# Patient Record
Sex: FEMALE | Race: WHITE | HISPANIC OR LATINO | Employment: FULL TIME | URBAN - METROPOLITAN AREA
[De-identification: names, ages, dates, MRNs, and addresses within clinical notes are randomized per-mention and may not be internally consistent; named-entity substitution may affect disease eponyms.]

---

## 2017-01-03 ENCOUNTER — HOSPITAL ENCOUNTER (OUTPATIENT)
Dept: CT IMAGING | Facility: HOSPITAL | Age: 47
Discharge: HOME | End: 2017-01-03
Admitting: NURSE PRACTITIONER

## 2017-01-03 PROCEDURE — 74150 CT ABDOMEN W/O CONTRAST: CPT

## 2017-04-19 DIAGNOSIS — R10.13 EPIGASTRIC PAIN: ICD-10-CM

## 2017-04-19 RX ORDER — OMEPRAZOLE 40 MG/1
CAPSULE, DELAYED RELEASE ORAL
Qty: 30 CAPSULE | Refills: 1 | Status: SHIPPED | OUTPATIENT
Start: 2017-04-19 | End: 2017-06-26 | Stop reason: SDUPTHER

## 2017-04-21 ENCOUNTER — OFFICE VISIT (OUTPATIENT)
Dept: FAMILY MEDICINE CLINIC | Facility: CLINIC | Age: 47
End: 2017-04-21

## 2017-04-21 VITALS
TEMPERATURE: 98.1 F | SYSTOLIC BLOOD PRESSURE: 110 MMHG | RESPIRATION RATE: 16 BRPM | DIASTOLIC BLOOD PRESSURE: 80 MMHG | OXYGEN SATURATION: 99 % | BODY MASS INDEX: 30.66 KG/M2 | HEART RATE: 67 BPM | WEIGHT: 184 LBS | HEIGHT: 65 IN

## 2017-04-21 DIAGNOSIS — R25.1 TREMORS OF NERVOUS SYSTEM: ICD-10-CM

## 2017-04-21 DIAGNOSIS — R53.83 FATIGUE, UNSPECIFIED TYPE: Primary | ICD-10-CM

## 2017-04-21 DIAGNOSIS — M25.50 PAIN, JOINT, MULTIPLE SITES: ICD-10-CM

## 2017-04-21 PROCEDURE — 99214 OFFICE O/P EST MOD 30 MIN: CPT | Performed by: NURSE PRACTITIONER

## 2017-04-21 NOTE — PROGRESS NOTES
Subjective   Breanne Silva is a 46 y.o. female.     History of Present Illness   Patient complains of bilateral hands and feet pain. The symptoms began 2 months ago.  Pain is a result of no known event.  Discomfort is described as aching. Symptoms are exacerbated by worse in the morning and evenings.  Evaluation to date: none. Therapy to date includes: nothing specific.    Patient also reports fatigue, intention tremors and intermittent tingling to right face.  Symptoms have all been present for a couple of months. Denies numbness, weakness or facial droop.    The following portions of the patient's history were reviewed and updated as appropriate: allergies, current medications, past family history, past medical history, past social history, past surgical history and problem list.    Review of Systems   Constitutional: Positive for fatigue.   Musculoskeletal: Positive for arthralgias. Negative for joint swelling.   Neurological: Positive for tremors and weakness. Negative for dizziness, seizures, syncope, facial asymmetry, speech difficulty, light-headedness, numbness and headaches.       Objective   Physical Exam   Constitutional: She is oriented to person, place, and time. She appears well-developed and well-nourished.   Eyes: Conjunctivae, EOM and lids are normal. Pupils are equal, round, and reactive to light.   Neck: Carotid bruit is not present.   Cardiovascular: Normal rate and regular rhythm.    Pulmonary/Chest: Effort normal and breath sounds normal.   Neurological: She is alert and oriented to person, place, and time. No cranial nerve deficit.   Psychiatric: She has a normal mood and affect. Judgment normal.   Vitals reviewed.      Assessment/Plan   Breanne was seen today for joint pain.    Diagnoses and all orders for this visit:    Fatigue, unspecified type  -     Magnesium  -     Vitamin B12  -     Vitamin D 25 Hydroxy  -     Rheumatoid Factor, Quant  -     ONI  -     C-reactive protein  -      Sedimentation rate, automated    Pain, joint, multiple sites  -     Magnesium  -     Vitamin B12  -     Vitamin D 25 Hydroxy  -     Rheumatoid Factor, Quant  -     ONI  -     C-reactive protein  -     Sedimentation rate, automated    Tremors of nervous system  -     Magnesium  -     Vitamin B12  -     Vitamin D 25 Hydroxy  -     Rheumatoid Factor, Quant  -     ONI  -     C-reactive protein  -     Sedimentation rate, automated

## 2017-04-24 LAB
25(OH)D3+25(OH)D2 SERPL-MCNC: 43.4 NG/ML (ref 30–100)
ANA SER QL: NEGATIVE
CRP SERPL-MCNC: 0.21 MG/DL (ref 0–0.5)
ERYTHROCYTE [SEDIMENTATION RATE] IN BLOOD BY WESTERGREN METHOD: 27 MM/HR (ref 0–20)
MAGNESIUM SERPL-MCNC: 2.2 MG/DL (ref 1.6–2.6)
RHEUMATOID FACT SERPL-ACNC: <10 IU/ML (ref 0–13.9)
VIT B12 SERPL-MCNC: 609 PG/ML (ref 211–946)

## 2017-04-25 ENCOUNTER — TELEPHONE (OUTPATIENT)
Dept: FAMILY MEDICINE CLINIC | Facility: CLINIC | Age: 47
End: 2017-04-25

## 2017-04-25 DIAGNOSIS — M25.50 PAIN, JOINT, MULTIPLE SITES: Primary | ICD-10-CM

## 2017-04-25 NOTE — TELEPHONE ENCOUNTER
Pt was called and message was left with info stated, pt to call with any questions. Referral to neurology placed

## 2017-04-25 NOTE — TELEPHONE ENCOUNTER
----- Message from AMMY Hernandez sent at 4/24/2017  4:44 PM EDT -----  Labs do not indicate cause for pain.  Refer to neurology for further workup of symptoms.

## 2017-06-26 DIAGNOSIS — R10.13 EPIGASTRIC PAIN: ICD-10-CM

## 2017-06-27 RX ORDER — OMEPRAZOLE 40 MG/1
CAPSULE, DELAYED RELEASE ORAL
Qty: 30 CAPSULE | Refills: 0 | Status: SHIPPED | OUTPATIENT
Start: 2017-06-27 | End: 2017-07-28 | Stop reason: SDUPTHER

## 2017-07-28 DIAGNOSIS — R10.13 EPIGASTRIC PAIN: ICD-10-CM

## 2017-07-28 RX ORDER — OMEPRAZOLE 40 MG/1
CAPSULE, DELAYED RELEASE ORAL
Qty: 14 CAPSULE | Refills: 0 | OUTPATIENT
Start: 2017-07-28 | End: 2020-10-27

## 2017-08-01 ENCOUNTER — APPOINTMENT (OUTPATIENT)
Dept: CT IMAGING | Facility: HOSPITAL | Age: 47
End: 2017-08-01

## 2017-08-01 ENCOUNTER — HOSPITAL ENCOUNTER (EMERGENCY)
Facility: HOSPITAL | Age: 47
Discharge: HOME | End: 2017-08-02
Attending: EMERGENCY MEDICINE | Admitting: EMERGENCY MEDICINE

## 2017-08-01 DIAGNOSIS — G43.109 COMPLICATED MIGRAINE: Primary | ICD-10-CM

## 2017-08-01 LAB
BASOPHILS # BLD AUTO: 0.02 10*3/MM3 (ref 0–0.2)
BASOPHILS NFR BLD AUTO: 0.4 % (ref 0–1.5)
DEPRECATED RDW RBC AUTO: 45.8 FL (ref 37–54)
EOSINOPHIL # BLD AUTO: 0.11 10*3/MM3 (ref 0–0.7)
EOSINOPHIL NFR BLD AUTO: 2.1 % (ref 0.3–6.2)
ERYTHROCYTE [DISTWIDTH] IN BLOOD BY AUTOMATED COUNT: 13.3 % (ref 11.7–13)
HCT VFR BLD AUTO: 42 % (ref 35.6–45.5)
HGB BLD-MCNC: 13.8 G/DL (ref 11.9–15.5)
IMM GRANULOCYTES # BLD: 0 10*3/MM3 (ref 0–0.03)
IMM GRANULOCYTES NFR BLD: 0 % (ref 0–0.5)
LYMPHOCYTES # BLD AUTO: 2.36 10*3/MM3 (ref 0.9–4.8)
LYMPHOCYTES NFR BLD AUTO: 45.7 % (ref 19.6–45.3)
MCH RBC QN AUTO: 30.9 PG (ref 26.9–32)
MCHC RBC AUTO-ENTMCNC: 32.9 G/DL (ref 32.4–36.3)
MCV RBC AUTO: 94.2 FL (ref 80.5–98.2)
MONOCYTES # BLD AUTO: 0.28 10*3/MM3 (ref 0.2–1.2)
MONOCYTES NFR BLD AUTO: 5.4 % (ref 5–12)
NEUTROPHILS # BLD AUTO: 2.39 10*3/MM3 (ref 1.9–8.1)
NEUTROPHILS NFR BLD AUTO: 46.4 % (ref 42.7–76)
PLATELET # BLD AUTO: 267 10*3/MM3 (ref 140–500)
PMV BLD AUTO: 9.9 FL (ref 6–12)
RBC # BLD AUTO: 4.46 10*6/MM3 (ref 3.9–5.2)
WBC NRBC COR # BLD: 5.16 10*3/MM3 (ref 4.5–10.7)

## 2017-08-01 PROCEDURE — 86140 C-REACTIVE PROTEIN: CPT | Performed by: EMERGENCY MEDICINE

## 2017-08-01 PROCEDURE — 83735 ASSAY OF MAGNESIUM: CPT | Performed by: EMERGENCY MEDICINE

## 2017-08-01 PROCEDURE — 85652 RBC SED RATE AUTOMATED: CPT | Performed by: EMERGENCY MEDICINE

## 2017-08-01 PROCEDURE — 84145 PROCALCITONIN (PCT): CPT | Performed by: EMERGENCY MEDICINE

## 2017-08-01 PROCEDURE — 85610 PROTHROMBIN TIME: CPT | Performed by: EMERGENCY MEDICINE

## 2017-08-01 PROCEDURE — 80053 COMPREHEN METABOLIC PANEL: CPT | Performed by: EMERGENCY MEDICINE

## 2017-08-01 PROCEDURE — 85025 COMPLETE CBC W/AUTO DIFF WBC: CPT | Performed by: EMERGENCY MEDICINE

## 2017-08-01 PROCEDURE — 99284 EMERGENCY DEPT VISIT MOD MDM: CPT

## 2017-08-01 PROCEDURE — 36415 COLL VENOUS BLD VENIPUNCTURE: CPT

## 2017-08-01 RX ORDER — SODIUM CHLORIDE 0.9 % (FLUSH) 0.9 %
10 SYRINGE (ML) INJECTION AS NEEDED
Status: DISCONTINUED | OUTPATIENT
Start: 2017-08-01 | End: 2017-08-02 | Stop reason: HOSPADM

## 2017-08-02 ENCOUNTER — APPOINTMENT (OUTPATIENT)
Dept: CT IMAGING | Facility: HOSPITAL | Age: 47
End: 2017-08-02

## 2017-08-02 VITALS
RESPIRATION RATE: 16 BRPM | HEIGHT: 64 IN | SYSTOLIC BLOOD PRESSURE: 113 MMHG | TEMPERATURE: 97.6 F | HEART RATE: 55 BPM | WEIGHT: 160 LBS | OXYGEN SATURATION: 96 % | DIASTOLIC BLOOD PRESSURE: 80 MMHG | BODY MASS INDEX: 27.31 KG/M2

## 2017-08-02 LAB
ALBUMIN SERPL-MCNC: 4.2 G/DL (ref 3.5–5.2)
ALBUMIN/GLOB SERPL: 1.3 G/DL
ALP SERPL-CCNC: 47 U/L (ref 39–117)
ALT SERPL W P-5'-P-CCNC: 18 U/L (ref 1–33)
ANION GAP SERPL CALCULATED.3IONS-SCNC: 11.7 MMOL/L
AST SERPL-CCNC: 19 U/L (ref 1–32)
BILIRUB SERPL-MCNC: 0.2 MG/DL (ref 0.1–1.2)
BUN BLD-MCNC: 19 MG/DL (ref 6–20)
BUN/CREAT SERPL: 27.5 (ref 7–25)
CALCIUM SPEC-SCNC: 9.7 MG/DL (ref 8.6–10.5)
CHLORIDE SERPL-SCNC: 102 MMOL/L (ref 98–107)
CO2 SERPL-SCNC: 27.3 MMOL/L (ref 22–29)
CREAT BLD-MCNC: 0.69 MG/DL (ref 0.57–1)
CRP SERPL-MCNC: 0.06 MG/DL (ref 0–0.5)
ERYTHROCYTE [SEDIMENTATION RATE] IN BLOOD: 12 MM/HR (ref 0–20)
GFR SERPL CREATININE-BSD FRML MDRD: 91 ML/MIN/1.73
GLOBULIN UR ELPH-MCNC: 3.3 GM/DL
GLUCOSE BLD-MCNC: 94 MG/DL (ref 65–99)
INR PPP: 1.03 (ref 0.9–1.1)
MAGNESIUM SERPL-MCNC: 2.1 MG/DL (ref 1.6–2.6)
POTASSIUM BLD-SCNC: 3.9 MMOL/L (ref 3.5–5.2)
PROCALCITONIN SERPL-MCNC: 0.09 NG/ML (ref 0.1–0.25)
PROT SERPL-MCNC: 7.5 G/DL (ref 6–8.5)
PROTHROMBIN TIME: 13.1 SECONDS (ref 11.7–14.2)
SODIUM BLD-SCNC: 141 MMOL/L (ref 136–145)

## 2017-08-02 PROCEDURE — 25010000002 PROCHLORPERAZINE EDISYLATE PER 10 MG: Performed by: EMERGENCY MEDICINE

## 2017-08-02 PROCEDURE — 96374 THER/PROPH/DIAG INJ IV PUSH: CPT

## 2017-08-02 PROCEDURE — 25010000002 DIPHENHYDRAMINE PER 50 MG: Performed by: EMERGENCY MEDICINE

## 2017-08-02 PROCEDURE — 70498 CT ANGIOGRAPHY NECK: CPT

## 2017-08-02 PROCEDURE — 70496 CT ANGIOGRAPHY HEAD: CPT

## 2017-08-02 PROCEDURE — 96375 TX/PRO/DX INJ NEW DRUG ADDON: CPT

## 2017-08-02 PROCEDURE — 96361 HYDRATE IV INFUSION ADD-ON: CPT

## 2017-08-02 PROCEDURE — 0 IOPAMIDOL 61 % SOLUTION: Performed by: EMERGENCY MEDICINE

## 2017-08-02 RX ORDER — DIPHENHYDRAMINE HYDROCHLORIDE 50 MG/ML
25 INJECTION INTRAMUSCULAR; INTRAVENOUS EVERY 6 HOURS PRN
Status: DISCONTINUED | OUTPATIENT
Start: 2017-08-02 | End: 2017-08-02 | Stop reason: HOSPADM

## 2017-08-02 RX ORDER — SUMATRIPTAN 25 MG/1
25 TABLET, FILM COATED ORAL
Qty: 30 TABLET | Refills: 0 | Status: SHIPPED | OUTPATIENT
Start: 2017-08-02 | End: 2021-08-25

## 2017-08-02 RX ADMIN — PROCHLORPERAZINE EDISYLATE 10 MG: 5 INJECTION INTRAMUSCULAR; INTRAVENOUS at 00:58

## 2017-08-02 RX ADMIN — IOPAMIDOL 85 ML: 612 INJECTION, SOLUTION INTRAVENOUS at 00:43

## 2017-08-02 RX ADMIN — SODIUM CHLORIDE 500 ML: 9 INJECTION, SOLUTION INTRAVENOUS at 00:03

## 2017-08-02 RX ADMIN — DIPHENHYDRAMINE HYDROCHLORIDE 25 MG: 50 INJECTION, SOLUTION INTRAMUSCULAR; INTRAVENOUS at 00:59

## 2017-08-02 RX ADMIN — SODIUM CHLORIDE 500 ML: 9 INJECTION, SOLUTION INTRAVENOUS at 00:56

## 2017-08-02 NOTE — ED PROVIDER NOTES
EMERGENCY DEPARTMENT ENCOUNTER    CHIEF COMPLAINT  Chief Complaint: neurologic deficit  History given by: patient  History limited by: nothing  Room Number: 14/14  PMD: Vincent Louis MD      HPI:  Pt is a 47 y.o. female who presents complaining of an episode of right sided peripheral vision loss yesterday. Pt states that she also felt off-balance and states that if she attempted to look to the right, she became nauseated. Pt  States that her symptoms resolved after 5-10 minutes. Pt states that she has had a HA, nausea and felt fatigued since her vision issues resolved. Pt states that she developed left sided neck pain and increased blurred vision earlier today. Pt denies trouble ambulating, family history of CVA or a history of diagnosed migraines. Pt states that she has had extreme muscle fatigue and intermittent blurred vision over the last 6 months but denies similar symptoms previously. Pt states that she has taken ibuprofen and Tylenol w/o relief.    Duration:  1.5 days  Onset: sudden  Timing: constant  Location: right peripheral vision  Radiation: none  Quality: loss of vision  Intensity/Severity: moderate to severe  Progression: resolved  Associated Symptoms: HA, nausea, fatigue, left sided neck pain, blurred vision  Aggravating Factors: none  Alleviating Factors: none  Previous Episodes: Pt states that she has had extreme muscle fatigue and intermittent blurred vision over the last 6 months but denies similar symptoms previously.  Treatment before arrival: Pt states that she has taken ibuprofen and Tylenol w/o relief.      PAST MEDICAL HISTORY  Active Ambulatory Problems     Diagnosis Date Noted   • Mass of breast 06/15/2012   • Seasonal allergic rhinitis 11/10/2016   • Asthma 11/10/2016     Resolved Ambulatory Problems     Diagnosis Date Noted   • No Resolved Ambulatory Problems     Past Medical History:   Diagnosis Date   • Asthma        PAST SURGICAL HISTORY  History reviewed. No pertinent surgical  history.    FAMILY HISTORY  Family History   Problem Relation Age of Onset   • Hyperlipidemia Father    • Hypertension Father    • Heart disease Maternal Grandmother    • Heart disease Paternal Grandmother    • Heart disease Paternal Grandfather        SOCIAL HISTORY  Social History     Social History   • Marital status: Single     Spouse name: N/A   • Number of children: N/A   • Years of education: N/A     Occupational History   • Not on file.     Social History Main Topics   • Smoking status: Former Smoker     Years: 25.00     Quit date: 5/18/2013   • Smokeless tobacco: Never Used   • Alcohol use No   • Drug use: No   • Sexual activity: Defer     Other Topics Concern   • Not on file     Social History Narrative       ALLERGIES  Review of patient's allergies indicates no known allergies.    REVIEW OF SYSTEMS  Review of Systems   Constitutional: Positive for fatigue. Negative for fever.   HENT: Negative for sore throat.    Eyes: Positive for visual disturbance (right peripheral vision loss, blurred vision).   Respiratory: Negative for cough and shortness of breath.    Cardiovascular: Negative for chest pain.   Gastrointestinal: Positive for nausea. Negative for abdominal pain, diarrhea and vomiting.   Genitourinary: Negative for dysuria.   Musculoskeletal: Positive for neck pain (left sided).   Skin: Negative for rash.   Allergic/Immunologic: Negative.    Neurological: Positive for headaches. Negative for weakness and numbness.   Hematological: Negative.    Psychiatric/Behavioral: Negative.    All other systems reviewed and are negative.      PHYSICAL EXAM  ED Triage Vitals   Temp Heart Rate Resp BP SpO2   08/01/17 2223 08/01/17 2223 08/01/17 2223 08/01/17 2228 08/01/17 2223   98.2 °F (36.8 °C) 72 16 139/88 97 %      Temp src Heart Rate Source Patient Position BP Location FiO2 (%)   08/01/17 2223 08/01/17 2223 -- -- --   Tympanic Monitor          Physical Exam   Constitutional: She is oriented to person, place, and  time and well-developed, well-nourished, and in no distress. No distress.   HENT:   Head: Normocephalic and atraumatic.   Eyes: EOM are normal. Pupils are equal, round, and reactive to light.   Neck: Normal range of motion. Neck supple.   Cardiovascular: Normal rate, regular rhythm and normal heart sounds.    Pulmonary/Chest: Effort normal and breath sounds normal. No respiratory distress.   Abdominal: Soft. There is no tenderness. There is no rebound and no guarding.   Musculoskeletal: Normal range of motion. She exhibits no edema.   Neurological: She is alert and oriented to person, place, and time. She has normal sensation and normal strength.   Pt has no focal neurologic deficit   Skin: Skin is warm and dry. No rash noted.   Psychiatric: Mood and affect normal.   Nursing note and vitals reviewed.      LAB RESULTS  Lab Results (last 24 hours)     Procedure Component Value Units Date/Time    Sedimentation Rate [[id number]]  (Normal) Collected:  08/01/17 2337    Specimen:  Blood Updated:  08/02/17 0027     Sed Rate 12 mm/hr     CBC & Differential [[id number]] Collected:  08/01/17 2337    Specimen:  Blood Updated:  08/01/17 2354    Narrative:       The following orders were created for panel order CBC & Differential.  Procedure                               Abnormality         Status                     ---------                               -----------         ------                     CBC Auto Differential[[id number]]        Abnormal            Final result                 Please view results for these tests on the individual orders.    CBC Auto Differential [[id number]]  (Abnormal) Collected:  08/01/17 2337    Specimen:  Blood Updated:  08/01/17 2354     WBC 5.16 10*3/mm3      RBC 4.46 10*6/mm3      Hemoglobin 13.8 g/dL      Hematocrit 42.0 %      MCV 94.2 fL      MCH 30.9 pg      MCHC 32.9 g/dL      RDW 13.3 (H) %      RDW-SD 45.8 fl      MPV 9.9 fL      Platelets 267 10*3/mm3      Neutrophil % 46.4 %       Lymphocyte % 45.7 (H) %      Monocyte % 5.4 %      Eosinophil % 2.1 %      Basophil % 0.4 %      Immature Grans % 0.0 %      Neutrophils, Absolute 2.39 10*3/mm3      Lymphocytes, Absolute 2.36 10*3/mm3      Monocytes, Absolute 0.28 10*3/mm3      Eosinophils, Absolute 0.11 10*3/mm3      Basophils, Absolute 0.02 10*3/mm3      Immature Grans, Absolute 0.00 10*3/mm3     Comprehensive Metabolic Panel [[id number]]  (Abnormal) Collected:  08/01/17 2355    Specimen:  Blood Updated:  08/02/17 0027     Glucose 94 mg/dL      BUN 19 mg/dL      Creatinine 0.69 mg/dL      Sodium 141 mmol/L      Potassium 3.9 mmol/L      Chloride 102 mmol/L      CO2 27.3 mmol/L      Calcium 9.7 mg/dL      Total Protein 7.5 g/dL      Albumin 4.20 g/dL      ALT (SGPT) 18 U/L      AST (SGOT) 19 U/L      Alkaline Phosphatase 47 U/L      Total Bilirubin 0.2 mg/dL      eGFR Non African Amer 91 mL/min/1.73      Globulin 3.3 gm/dL      A/G Ratio 1.3 g/dL      BUN/Creatinine Ratio 27.5 (H)     Anion Gap 11.7 mmol/L     Protime-INR [[id number]]  (Normal) Collected:  08/01/17 2355    Specimen:  Blood Updated:  08/02/17 0020     Protime 13.1 Seconds      INR 1.03    C-reactive Protein [[id number]]  (Normal) Collected:  08/01/17 2355    Specimen:  Blood Updated:  08/02/17 0027     C-Reactive Protein 0.06 mg/dL     Magnesium [[id number]]  (Normal) Collected:  08/01/17 2355    Specimen:  Blood Updated:  08/02/17 0027     Magnesium 2.1 mg/dL     Procalcitonin [[id number]]  (Abnormal) Collected:  08/01/17 2355    Specimen:  Blood Updated:  08/02/17 0033     Procalcitonin 0.09 (L) ng/mL     Narrative:       As a Marker for Sepsis (Non-Neonates):   1. <0.5 ng/mL represents a low risk of severe sepsis and/or septic shock.  1. >2 ng/mL represents a high risk of severe sepsis and/or septic shock.    As a Marker for Lower Respiratory Tract Infections that require antibiotic therapy:  PCT on Admission     Antibiotic Therapy             6-12 Hrs later  > 0.5                 "Strongly Recommended            >0.25 - <0.5         Recommended  0.1 - 0.25           Discouraged                   Remeasure/reassess PCT  <0.1                 Strongly Discouraged          Remeasure/reassess PCT      As 28 day mortality risk marker: \"Change in Procalcitonin Result\" (> 80 % or <=80 %) if Day 0 (or Day 1) and Day 4 values are available. Refer to http://www.[employer]pct-calculator[employer]/   Change in PCT <=80 %   A decrease of PCT levels below or equal to 80 % defines a positive change in PCT test result representing a higher risk for 28-day all-cause mortality of patients diagnosed with severe sepsis or septic shock.  Change in PCT > 80 %   A decrease of PCT levels of more than 80 % defines a negative change in PCT result representing a lower risk for 28-day all-cause mortality of patients diagnosed with severe sepsis or septic shock.                      I ordered the above labs and reviewed the results    RADIOLOGY  CT Angiogram Neck With & Without Contrast   Preliminary Result   1.  No hemodynamically significant stenosis or aneurysm identified.   2.  0.4 cm density image 29 in the right frontal lobe, likely to be a   calcification, intra-axial hemorrhage appears less likely, follow-up is   recommended.       ----------------------------------------------------------------       CT ANGIOGRAM NECK WITH AND WITHOUT CONTRAST.       TECHNIQUE: Routine enhanced CTA of the neck was performed. Sagittal and   coronal 2-dimensional reformations are provided for review. Also,   multiple 3-dimensional reformations are provided for review.       HISTORY:  Headache.       COMPARISON:  Right-sided vision loss.       FINDINGS:         The visualized aortic arch and great arteries are widely patent. The   innominate and bilateral subclavian arteries are patent.           The visualized bilateral common carotid arteries are patent without   hemodynamically significant stenosis.            The diameter of the origin " of the right internal carotid artery is 7.1   mm.  The diameter of the more distal right internal carotid artery,   distal to the carotid bulb, is 5.4 mm.  These values predict no   significant stenosis by NASCET criteria.       The diameter of the origin of the left internal carotid artery is 7.6   mm.  The diameter of the more distal left internal carotid artery,   distal to the carotid bulb, is 7 mm.  These values predict no   significant stenosis by NASCET criteria.       The more distal bilateral internal carotid arteries are continuously   patent without hemodynamically significant stenosis.        The bilateral external carotid arteries are patent.        The bilateral vertebral arteries are patent.       No neck CTA evidence of dissection, arteritis, fibromuscular dysplasia,   arterial occlusion, thromboembolic filling defect, hemodynamically   significant stenosis, or aneurysm.       IMPRESSION:         No hemodynamically significant stenosis.  No significant NASCET criteria   stenosis in the bilateral proximal internal carotid arteries is   appreciated.                      CT Angiogram Head With Contrast   Preliminary Result   1.  No hemodynamically significant stenosis or aneurysm identified.   2.  0.4 cm density image 29 in the right frontal lobe, likely to be a   calcification, intra-axial hemorrhage appears less likely, follow-up is   recommended.       ----------------------------------------------------------------       CT ANGIOGRAM NECK WITH AND WITHOUT CONTRAST.       TECHNIQUE: Routine enhanced CTA of the neck was performed. Sagittal and   coronal 2-dimensional reformations are provided for review. Also,   multiple 3-dimensional reformations are provided for review.       HISTORY:  Headache.       COMPARISON:  Right-sided vision loss.       FINDINGS:         The visualized aortic arch and great arteries are widely patent. The   innominate and bilateral subclavian arteries are patent.            The visualized bilateral common carotid arteries are patent without   hemodynamically significant stenosis.            The diameter of the origin of the right internal carotid artery is 7.1   mm.  The diameter of the more distal right internal carotid artery,   distal to the carotid bulb, is 5.4 mm.  These values predict no   significant stenosis by NASCET criteria.       The diameter of the origin of the left internal carotid artery is 7.6   mm.  The diameter of the more distal left internal carotid artery,   distal to the carotid bulb, is 7 mm.  These values predict no   significant stenosis by NASCET criteria.       The more distal bilateral internal carotid arteries are continuously   patent without hemodynamically significant stenosis.        The bilateral external carotid arteries are patent.        The bilateral vertebral arteries are patent.       No neck CTA evidence of dissection, arteritis, fibromuscular dysplasia,   arterial occlusion, thromboembolic filling defect, hemodynamically   significant stenosis, or aneurysm.       IMPRESSION:         No hemodynamically significant stenosis.  No significant NASCET criteria   stenosis in the bilateral proximal internal carotid arteries is   appreciated.                           I ordered the above noted radiological studies. Interpreted by radiologist. Reviewed by me in PACS.       PROCEDURES  Procedures      PROGRESS AND CONSULTS  ED Course     0025- D/w pt and family that the pt is most likely not having a stroke since she does have a HA. D/w pt and family that the pt's symptoms are c/w a complex migraine. Discussed the plan to order CTA Head and Neck for further evaluation of their symptoms. Pt and family agree with the plan and all questions were addressed.    0037- Ordered Compazine and Benadryl for HA and IVF for hydration.     0142- Rechecked pt. Pt states that her HA has improved after the medication. Notified pt and family of the pt's unremarkable CTA  Head and Neck results. D/w pt and family that her symptoms are c/w a complex migraine. Discussed the plan to discharge the pt home with a prescription for migraine medication. Pt and family agree with the plan and all questions were addressed.    1:44 AM  Latest vital signs   BP- 115/82 HR- 59 Temp- 98.2 °F (36.8 °C) (Tympanic) O2 sat- 97%      MEDICAL DECISION MAKING  Results were reviewed/discussed with the patient and they were also made aware of online access. Pt also made aware that some labs, such as cultures, will not be resulted during ER visit and follow up with PMD is necessary.     MDM  Number of Diagnoses or Management Options     Amount and/or Complexity of Data Reviewed  Clinical lab tests: ordered and reviewed (Labs are unremarkable)  Tests in the radiology section of CPT®: ordered and reviewed (CTA Head and Neck show nothing acute)  Independent visualization of images, tracings, or specimens: yes    Pt is not a tPA candidate due to NIHSS=0       DIAGNOSIS  Final diagnoses:   Complicated migraine with aura       DISPOSITION  DISCHARGE    Patient discharged in stable condition.    Reviewed implications of results, diagnosis, meds, responsibility to follow up, warning signs and symptoms of possible worsening, potential complications and reasons to return to ER, including fever, worsening pain or any concerns.    Patient/Family voiced understanding of above instructions.    Discussed plan for discharge, as there is no emergent indication for admission.  Pt/family is agreeable and understands need for follow up and repeat testing.  Pt is aware that discharge does not mean that nothing is wrong but it indicates no emergency is present that requires admission and they must continue care with follow-up as given below or physician of their choice.     FOLLOW-UP  Vincent Louis MD  [id number] [address] [address] [phone]    Call  As needed, If symptoms worsen         Medication List       New Prescriptions          SUMAtriptan 25 MG tablet   Commonly known as:  IMITREX   Take 1 tablet by mouth Every 2 (Two) Hours As Needed for Migraine. Take   one tablet at onset of headache. May repeat dose one time in 2 hours               Latest Documented Vital Signs:  As of 2:05 AM  BP- 115/82 HR- 59 Temp- 98.2 °F (36.8 °C) (Tympanic) O2 sat- 97%    --  Documentation assistance provided by ever Chow for Dr. Nava.  Information recorded by the ever was done at my direction and has been verified and validated by me.        Rajni Chow  08/02/17 0209       Lonnie Nava MD  08/02/17 0252

## 2017-08-02 NOTE — ED TRIAGE NOTES
Pt states she had loss of vision in her peripheral vision to her right eye yesterday lasted about 5 mins.  States she had a headache afterwards and the headache has not let up since. Now having left neck pain. States she has had intermittent numbness and tingling to the right side of her face.

## 2017-08-02 NOTE — ED NOTES
"Pt to Room 14 via private vehicle from home with c/o sudden onset of right sided peripheral vision loss occurring yesterday for 5 minutes.  Pt states that she has had neurological symptoms on and off for the last month that primarily present in left sided numbness to her face.  Pt also complains of right sided neck pain.  Pt states that she did have a HA start after the vision loss yesterday.  Pt presents today with diplopia, headache throughout and intermittent nausea.  Pt is alert and oriented X4, PERRLA, respirations are even and unlabored, chest rise and fall is equal in expansion.  Pt does not appear to be in distress at this time.  Pt denies fever, chills, chest pain, abdominal pain, loss in control of bowel/bladder.  Bed in low position, call light within reach, side rails up X2.  Pt states that she is suppose to follow up with a [facility]ist next Tuesday for evaluation.  Pt was seen by her PCP and had \"extensive blood work done.\"       Zuleima Hdz RN  08/01/17 5351    "

## 2017-08-09 ENCOUNTER — TELEPHONE (OUTPATIENT)
Dept: SOCIAL WORK | Facility: HOSPITAL | Age: 47
End: 2017-08-09

## 2017-08-09 NOTE — TELEPHONE ENCOUNTER
ED f/u phone call: states that she is not feeling much better, and followed up w/ neurologist yesterday. No questions/concerns  
yes

## 2017-11-30 ENCOUNTER — OFFICE VISIT (OUTPATIENT)
Dept: FAMILY MEDICINE CLINIC | Facility: CLINIC | Age: 47
End: 2017-11-30

## 2017-11-30 VITALS
OXYGEN SATURATION: 97 % | SYSTOLIC BLOOD PRESSURE: 119 MMHG | RESPIRATION RATE: 16 BRPM | HEIGHT: 65 IN | TEMPERATURE: 98.2 F | BODY MASS INDEX: 26.49 KG/M2 | HEART RATE: 58 BPM | DIASTOLIC BLOOD PRESSURE: 69 MMHG | WEIGHT: 159 LBS

## 2017-11-30 DIAGNOSIS — J01.00 ACUTE NON-RECURRENT MAXILLARY SINUSITIS: Primary | ICD-10-CM

## 2017-11-30 PROCEDURE — 99213 OFFICE O/P EST LOW 20 MIN: CPT | Performed by: FAMILY MEDICINE

## 2017-11-30 RX ORDER — AZITHROMYCIN 250 MG/1
TABLET, FILM COATED ORAL
Qty: 6 TABLET | Refills: 1 | Status: SHIPPED | OUTPATIENT
Start: 2017-11-30 | End: 2018-03-23

## 2017-11-30 NOTE — PROGRESS NOTES
"Subjective   Breanne Silva is a 47 y.o. female.     CC: URI    History of Present Illness     Breanne Silva 47 y.o. female who presents for evaluation of sinus pressure and congestion, cough. Symptoms include congestion and dry cough.  Onset of symptoms was 3 days ago, gradually worsening since that time. Patient denies hemoptysis.   Evaluation to date: none Treatment to date:  none.       The following portions of the patient's history were reviewed and updated as appropriate: allergies, current medications, past family history, past medical history, past social history, past surgical history and problem list.    Review of Systems   Constitutional: Negative for activity change, chills, fatigue and fever.   HENT: Positive for congestion and sinus pressure.    Respiratory: Positive for cough. Negative for chest tightness.    Cardiovascular: Negative for chest pain and palpitations.   Gastrointestinal: Negative for abdominal pain and nausea.   Endocrine: Negative for cold intolerance.   Psychiatric/Behavioral: Negative for behavioral problems and dysphoric mood.     /69  Pulse 58  Temp 98.2 °F (36.8 °C) (Oral)   Resp 16  Ht 64.5\" (163.8 cm)  Wt 159 lb (72.1 kg)  SpO2 97%  BMI 26.87 kg/m2    Objective   Physical Exam   Constitutional: She appears well-developed and well-nourished.   HENT:   Nose: Right sinus exhibits maxillary sinus tenderness. Left sinus exhibits maxillary sinus tenderness.   Neck: Neck supple. No thyromegaly present.   Cardiovascular: Normal rate and regular rhythm.    No murmur heard.  Pulmonary/Chest: Effort normal and breath sounds normal.   Lymphadenopathy:     She has no cervical adenopathy.   Psychiatric: She has a normal mood and affect. Her behavior is normal.   Nursing note and vitals reviewed.      Assessment/Plan   Breanne was seen today for nasal congestion, sinusitis, sore throat, asthma and earache.    Diagnoses and all orders for this visit:    Acute " non-recurrent maxillary sinusitis  -     azithromycin (ZITHROMAX Z-GABRIELA) 250 MG tablet; Take 2 tablets the first day, then 1 tablet daily for 4 days.

## 2018-03-23 ENCOUNTER — OFFICE VISIT (OUTPATIENT)
Dept: FAMILY MEDICINE CLINIC | Facility: CLINIC | Age: 48
End: 2018-03-23

## 2018-03-23 VITALS
TEMPERATURE: 97.7 F | SYSTOLIC BLOOD PRESSURE: 113 MMHG | WEIGHT: 165 LBS | BODY MASS INDEX: 27.49 KG/M2 | HEART RATE: 77 BPM | HEIGHT: 65 IN | RESPIRATION RATE: 16 BRPM | DIASTOLIC BLOOD PRESSURE: 70 MMHG

## 2018-03-23 DIAGNOSIS — J04.0 LARYNGITIS: Primary | ICD-10-CM

## 2018-03-23 PROCEDURE — 99213 OFFICE O/P EST LOW 20 MIN: CPT | Performed by: NURSE PRACTITIONER

## 2018-03-23 RX ORDER — PREDNISONE 20 MG/1
TABLET ORAL
Qty: 20 TABLET | Refills: 0 | Status: SHIPPED | OUTPATIENT
Start: 2018-03-23 | End: 2019-02-15

## 2018-03-23 RX ORDER — TOPIRAMATE 25 MG/1
CAPSULE, EXTENDED RELEASE ORAL
COMMUNITY
Start: 2018-03-08 | End: 2018-03-23

## 2018-03-23 RX ORDER — AMOXICILLIN 500 MG/1
CAPSULE ORAL
COMMUNITY
Start: 2018-03-14 | End: 2018-07-13

## 2018-03-23 RX ORDER — PHENTERMINE HYDROCHLORIDE 37.5 MG/1
37.5 TABLET ORAL EVERY MORNING
Refills: 0 | COMMUNITY
Start: 2018-03-09 | End: 2018-07-13

## 2018-03-23 NOTE — PROGRESS NOTES
Subjective   Breanne Silva is a 47 y.o. female.     History of Present Illness   Breanne Silva 47 y.o. female who presents for evaluation of sore throat. Symptoms include sore throat, post nasal drip and hoarseness.  Onset of symptoms was 2 week ago, gradually improving since that time. Patient denies shortness of breath, wheezing, fever.   Evaluation to date: was seen at [facility] Treatment to date:  antibiotics from prior visit. She finished amoxicillin yesterday. States throat is no longer sore but she is very hoarse and still has post nasal drainage. Reports mild dry cough at times that she feels is more from irritation in the throat.     The following portions of the patient's history were reviewed and updated as appropriate: allergies, current medications, past family history, past medical history, past social history, past surgical history and problem list.    Review of Systems   Constitutional: Positive for fatigue. Negative for chills and fever.   HENT: Positive for postnasal drip. Negative for congestion, sore throat and trouble swallowing.    Respiratory: Positive for cough. Negative for shortness of breath and wheezing.        Objective   Physical Exam   Constitutional: She is oriented to person, place, and time. She appears well-developed and well-nourished.   HENT:   Right Ear: Tympanic membrane, external ear and ear canal normal.   Left Ear: Tympanic membrane, external ear and ear canal normal.   Nose: No mucosal edema or rhinorrhea.   Mouth/Throat: Uvula is midline. Posterior oropharyngeal erythema present.   Mild erythema consistent with post nasal drainage   Cardiovascular: Normal rate and regular rhythm.    Pulmonary/Chest: Effort normal and breath sounds normal.   Neurological: She is alert and oriented to person, place, and time.   Skin: Skin is warm.   Psychiatric: She has a normal mood and affect. Judgment normal.   Nursing note and vitals reviewed.      Assessment/Plan    Breanne was seen today for sore throat.    Diagnoses and all orders for this visit:    Laryngitis  -     predniSONE (DELTASONE) 20 MG tablet; Take 3 tabs QDPC x 3 days then 2 tabs QDPC x 4 days then 1 tab QDPC x 3 days

## 2018-05-08 ENCOUNTER — APPOINTMENT (OUTPATIENT)
Dept: WOMENS IMAGING | Facility: HOSPITAL | Age: 48
End: 2018-05-08

## 2018-05-08 PROCEDURE — 77067 SCR MAMMO BI INCL CAD: CPT | Performed by: RADIOLOGY

## 2018-05-08 PROCEDURE — 77063 BREAST TOMOSYNTHESIS BI: CPT | Performed by: RADIOLOGY

## 2018-07-13 ENCOUNTER — OFFICE VISIT (OUTPATIENT)
Dept: FAMILY MEDICINE CLINIC | Facility: CLINIC | Age: 48
End: 2018-07-13

## 2018-07-13 VITALS
WEIGHT: 174 LBS | BODY MASS INDEX: 28.99 KG/M2 | TEMPERATURE: 97.5 F | HEART RATE: 65 BPM | HEIGHT: 65 IN | DIASTOLIC BLOOD PRESSURE: 79 MMHG | RESPIRATION RATE: 16 BRPM | SYSTOLIC BLOOD PRESSURE: 123 MMHG | OXYGEN SATURATION: 97 %

## 2018-07-13 DIAGNOSIS — N95.1 PERIMENOPAUSAL: Primary | ICD-10-CM

## 2018-07-13 PROCEDURE — 99213 OFFICE O/P EST LOW 20 MIN: CPT | Performed by: NURSE PRACTITIONER

## 2018-07-13 RX ORDER — TOPIRAMATE 25 MG/1
CAPSULE, EXTENDED RELEASE ORAL
COMMUNITY
Start: 2018-06-29 | End: 2020-10-27

## 2018-07-13 RX ORDER — AMITRIPTYLINE HYDROCHLORIDE 25 MG/1
25 TABLET, FILM COATED ORAL NIGHTLY
Qty: 30 TABLET | Refills: 1 | Status: SHIPPED | OUTPATIENT
Start: 2018-07-13 | End: 2022-04-29

## 2018-07-13 NOTE — PROGRESS NOTES
Subjective   Breanne Silva is a 48 y.o. female.     History of Present Illness   Patient presents to office to discuss tito-menopausal symptoms and referral to new GYN.  Patient states she has been prescribed OCP for management of tito-menopausal symptoms per her GYN at [place] but that it does not seem to be helping.  She reports hot flashes, night sweats, irritability.  She would like to change to [facility] provider.   The following portions of the patient's history were reviewed and updated as appropriate: allergies, current medications, past family history, past medical history, past social history, past surgical history and problem list.    Review of Systems   Constitutional: Positive for fatigue. Negative for unexpected weight change.   Eyes: Negative for visual disturbance.   Respiratory: Negative for shortness of breath.    Cardiovascular: Negative for chest pain and palpitations.   Gastrointestinal: Negative for diarrhea, nausea and vomiting.   Endocrine: Positive for heat intolerance. Negative for cold intolerance, polydipsia, polyphagia and polyuria.   Genitourinary: Negative for difficulty urinating, menstrual problem, vaginal discharge and vaginal pain.   Musculoskeletal: Negative for arthralgias and myalgias.   Neurological: Negative for dizziness, light-headedness and headaches.   Psychiatric/Behavioral: Positive for agitation. Negative for behavioral problems and decreased concentration.       Objective   Physical Exam   Constitutional: She is oriented to person, place, and time. She appears well-developed and well-nourished.   Pulmonary/Chest: Effort normal.   Neurological: She is alert and oriented to person, place, and time.   Psychiatric: She has a normal mood and affect. Her behavior is normal. Judgment and thought content normal.   Nursing note and vitals reviewed.      Assessment/Plan   Breanne was seen today for menopause.    Diagnoses and all orders for this visit:    Perimenopausal  -      Ambulatory Referral to Gynecology  -     amitriptyline (ELAVIL) 25 MG tablet; Take 1 tablet by mouth Every Night.        Discussed starting elvail to help manage some of the symptoms and not changing OCP at this time. Patient is agreeable. She was given contact information for [facility] which is [facility] affiliated.  She will call for appt.

## 2018-12-12 ENCOUNTER — OFFICE VISIT (OUTPATIENT)
Dept: RETAIL CLINIC | Facility: CLINIC | Age: 48
End: 2018-12-12

## 2018-12-12 VITALS
OXYGEN SATURATION: 98 % | RESPIRATION RATE: 18 BRPM | HEART RATE: 82 BPM | DIASTOLIC BLOOD PRESSURE: 64 MMHG | SYSTOLIC BLOOD PRESSURE: 120 MMHG | TEMPERATURE: 98.7 F

## 2018-12-12 DIAGNOSIS — J02.9 ACUTE PHARYNGITIS, UNSPECIFIED ETIOLOGY: Primary | ICD-10-CM

## 2018-12-12 DIAGNOSIS — J04.0 LARYNGITIS: ICD-10-CM

## 2018-12-12 DIAGNOSIS — H66.001 ACUTE SUPPURATIVE OTITIS MEDIA OF RIGHT EAR WITHOUT SPONTANEOUS RUPTURE OF TYMPANIC MEMBRANE, RECURRENCE NOT SPECIFIED: ICD-10-CM

## 2018-12-12 PROCEDURE — 99213 OFFICE O/P EST LOW 20 MIN: CPT | Performed by: NURSE PRACTITIONER

## 2018-12-12 RX ORDER — METHYLPREDNISOLONE 4 MG/1
TABLET ORAL
Qty: 21 EACH | Refills: 0 | Status: SHIPPED | OUTPATIENT
Start: 2018-12-12 | End: 2019-02-15

## 2018-12-12 RX ORDER — MONTELUKAST SODIUM 10 MG/1
10 TABLET ORAL NIGHTLY
COMMUNITY
End: 2019-09-12

## 2018-12-12 RX ORDER — AMOXICILLIN 875 MG/1
875 TABLET, COATED ORAL EVERY 12 HOURS SCHEDULED
Qty: 20 TABLET | Refills: 0 | Status: SHIPPED | OUTPATIENT
Start: 2018-12-12 | End: 2018-12-22

## 2018-12-12 RX ORDER — NORGESTIMATE AND ETHINYL ESTRADIOL 0.25-0.035
1 KIT ORAL DAILY
COMMUNITY
End: 2021-08-25

## 2018-12-12 NOTE — PATIENT INSTRUCTIONS
Pharyngitis  Pharyngitis is redness, pain, and swelling (inflammation) of the throat (pharynx). It is a very common cause of sore throat. Pharyngitis can be caused by a bacteria, but it is usually caused by a virus. Most cases of pharyngitis get better on their own without treatment.  What are the causes?  This condition may be caused by:  · Infection by viruses (viral). Viral pharyngitis spreads from person to person (is contagious) through coughing, sneezing, and sharing of personal items or utensils such as cups, forks, spoons, and toothbrushes.  · Infection by bacteria (bacterial). Bacterial pharyngitis may be spread by touching the nose or face after coming in contact with the bacteria, or through more intimate contact, such as kissing.  · Allergies. Allergies can cause buildup of mucus in the throat (post-nasal drip), leading to inflammation and irritation. Allergies can also cause blocked nasal passages, forcing breathing through the mouth, which dries and irritates the throat.    What increases the risk?  You are more likely to develop this condition if:  · You are 5-24 years old.  · You are exposed to crowded environments such as , school, or dormitory living.  · You live in a cold climate.  · You have a weakened disease-fighting (immune) system.    What are the signs or symptoms?  Symptoms of this condition vary by the cause (viral, bacterial, or allergies) and can include:  · Sore throat.  · Fatigue.  · Low-grade fever.  · Headache.  · Joint pain and muscle aches.  · Skin rashes.  · Swollen glands in the throat (lymph nodes).  · Plaque-like film on the throat or tonsils. This is often a symptom of bacterial pharyngitis.  · Vomiting.  · Stuffy nose (nasal congestion).  · Cough.  · Red, itchy eyes (conjunctivitis).  · Loss of appetite.    How is this diagnosed?  This condition is often diagnosed based on your medical history and a physical exam. Your health care provider will ask you questions about  your illness and your symptoms. A swab of your throat may be done to check for bacteria (rapid strep test). Other lab tests may also be done, depending on the suspected cause, but these are rare.  How is this treated?  This condition usually gets better in 3-4 days without medicine. Bacterial pharyngitis may be treated with antibiotic medicines.  Follow these instructions at home:  · Take over-the-counter and prescription medicines only as told by your health care provider.  ? If you were prescribed an antibiotic medicine, take it as told by your health care provider. Do not stop taking the antibiotic even if you start to feel better.  ? Do not give children aspirin because of the association with Reye syndrome.  · Drink enough water and fluids to keep your urine clear or pale yellow.  · Get a lot of rest.  · Gargle with a salt-water mixture 3-4 times a day or as needed. To make a salt-water mixture, completely dissolve ½-1 tsp of salt in 1 cup of warm water.  · If your health care provider approves, you may use throat lozenges or sprays to soothe your throat.  Contact a health care provider if:  · You have large, tender lumps in your neck.  · You have a rash.  · You cough up green, yellow-brown, or bloody spit.  Get help right away if:  · Your neck becomes stiff.  · You drool or are unable to swallow liquids.  · You cannot drink or take medicines without vomiting.  · You have severe pain that does not go away, even after you take medicine.  · You have trouble breathing, and it is not caused by a stuffy nose.  · You have new pain and swelling in your joints such as the knees, ankles, wrists, or elbows.  Summary  · Pharyngitis is redness, pain, and swelling (inflammation) of the throat (pharynx).  · While pharyngitis can be caused by a bacteria, the most common causes are viral.  · Most cases of pharyngitis get better on their own without treatment.  · Bacterial pharyngitis is treated with antibiotic medicines.  This  information is not intended to replace advice given to you by your health care provider. Make sure you discuss any questions you have with your health care provider.  Document Released: 12/18/2006 Document Revised: 01/23/2018 Document Reviewed: 01/23/2018  [employer] Interactive Patient Education © 2018 [employer]  Laryngitis  Laryngitis is inflammation of your vocal cords. This causes hoarseness, coughing, loss of voice, sore throat, or a dry throat. Your vocal cords are two bands of muscles that are found in your throat. When you speak, these cords come together and vibrate. These vibrations come out through your mouth as sound. When your vocal cords are inflamed, your voice sounds different.  Laryngitis can be temporary (acute) or long-term (chronic). Most cases of acute laryngitis improve with time. Chronic laryngitis is laryngitis that lasts for more than three weeks.  What are the causes?  Acute laryngitis may be caused by:  · A viral infection.  · Lots of talking, yelling, or singing. This is also called vocal strain.  · Bacterial infections.    Chronic laryngitis may be caused by:  · Vocal strain.  · Injury to your vocal cords.  · Acid reflux (gastroesophageal reflux disease or GERD).  · Allergies.  · Sinus infection.  · Smoking.  · Alcohol abuse.  · Breathing in chemicals or dust.  · Growths on the vocal cords.    What increases the risk?  Risk factors for laryngitis include:  · Smoking.  · Alcohol abuse.  · Having allergies.    What are the signs or symptoms?  Symptoms of laryngitis may include:  · Low, hoarse voice.  · Loss of voice.  · Dry cough.  · Sore throat.  · Stuffy nose.    How is this diagnosed?  Laryngitis may be diagnosed by:  · Physical exam.  · Throat culture.  · Blood test.  · Laryngoscopy. This procedure allows your health care provider to look at your vocal cords with a mirror or viewing tube.    How is this treated?  Treatment for laryngitis depends on what is causing it. Usually,  treatment involves resting your voice and using medicines to soothe your throat. However, if your laryngitis is caused by a bacterial infection, you may need to take antibiotic medicine. If your laryngitis is caused by a growth, you may need to have a procedure to remove it.  Follow these instructions at home:  · Drink enough fluid to keep your urine clear or pale yellow.  · Breathe in moist air. Use a humidifier if you live in a dry climate.  · Take medicines only as directed by your health care provider.  · If you were prescribed an antibiotic medicine, finish it all even if you start to feel better.  · Do not smoke cigarettes or electronic cigarettes. If you need help quitting, ask your health care provider.  · Talk as little as possible. Also avoid whispering, which can cause vocal strain.  · Write instead of talking. Do this until your voice is back to normal.  Contact a health care provider if:  · You have a fever.  · You have increasing pain.  · You have difficulty swallowing.  Get help right away if:  · You cough up blood.  · You have trouble breathing.  This information is not intended to replace advice given to you by your health care provider. Make sure you discuss any questions you have with your health care provider.  Document Released: 12/18/2006 Document Revised: 05/25/2017 Document Reviewed: 06/02/2015  [employer] Interactive Patient Education © 2018 [employer]

## 2018-12-12 NOTE — PROGRESS NOTES
Subjective   Breanne Garrido is a 48 y.o. female.     Sore Throat    This is a new problem. The current episode started in the past 7 days. The problem has been gradually worsening. The maximum temperature recorded prior to her arrival was 100.4 - 100.9 F. The pain is at a severity of 5/10. The pain is mild. Associated symptoms include congestion, ear discharge, ear pain (right), headaches and swollen glands. Associated symptoms comments: Laryngitis  . She has had no exposure to strep. Treatments tried: sneha kang cold plus, cepacol, chloraseptic, was seen on sunday at [facility] and was advised to treat symptoms. The treatment provided mild relief.        The following portions of the patient's history were reviewed and updated as appropriate: allergies, current medications, past family history, past medical history, past social history, past surgical history and problem list.    Review of Systems   HENT: Positive for congestion, ear discharge, ear pain (right) and sore throat.    Respiratory: Negative.    Cardiovascular: Negative.    Gastrointestinal: Negative.    Skin: Negative.    Neurological: Positive for headache.       Objective   Physical Exam   Constitutional: She is cooperative. No distress.   HENT:   Head: Normocephalic.   Right Ear: Hearing, external ear and ear canal normal. Tympanic membrane is erythematous. A middle ear effusion is present.   Left Ear: Hearing, tympanic membrane, external ear and ear canal normal.   Nose: Nose normal.   Mouth/Throat: Posterior oropharyngeal edema and posterior oropharyngeal erythema present. Tonsils are 2+ on the right. Tonsils are 2+ on the left. Tonsillar exudate.   Laryngitis     Eyes: Conjunctivae, EOM and lids are normal. Pupils are equal, round, and reactive to light.   Neck: Trachea normal and full passive range of motion without pain.   Cardiovascular: Normal rate, regular rhythm and normal pulses.   Pulmonary/Chest: Effort normal and breath sounds normal.    Lymphadenopathy:     She has cervical adenopathy.        Right cervical: Superficial cervical adenopathy present.        Left cervical: Superficial cervical adenopathy present.   Neurological: She is alert.   Skin: Skin is warm. Capillary refill takes less than 2 seconds.   Psychiatric: She has a normal mood and affect. Her speech is normal and behavior is normal.   Vitals reviewed.        Assessment/Plan   Breanne was seen today for sore throat.    Diagnoses and all orders for this visit:    Acute pharyngitis, unspecified etiology    Acute suppurative otitis media of right ear without spontaneous rupture of tympanic membrane, recurrence not specified    Laryngitis    Other orders  -     amoxicillin (AMOXIL) 875 MG tablet; Take 1 tablet by mouth Every 12 (Twelve) Hours for 10 days.  -     MethylPREDNISolone (MEDROL, GABRIELA,) 4 MG tablet; Take as directed on package instructions.

## 2019-01-25 ENCOUNTER — OFFICE VISIT (OUTPATIENT)
Dept: FAMILY MEDICINE CLINIC | Facility: CLINIC | Age: 49
End: 2019-01-25

## 2019-01-25 VITALS
HEART RATE: 67 BPM | BODY MASS INDEX: 29.99 KG/M2 | WEIGHT: 180 LBS | SYSTOLIC BLOOD PRESSURE: 90 MMHG | DIASTOLIC BLOOD PRESSURE: 60 MMHG | OXYGEN SATURATION: 97 % | TEMPERATURE: 98.3 F | RESPIRATION RATE: 16 BRPM | HEIGHT: 65 IN

## 2019-01-25 DIAGNOSIS — R68.89 FLU-LIKE SYMPTOMS: ICD-10-CM

## 2019-01-25 DIAGNOSIS — J01.90 ACUTE SINUSITIS, RECURRENCE NOT SPECIFIED, UNSPECIFIED LOCATION: Primary | ICD-10-CM

## 2019-01-25 LAB
EXPIRATION DATE: NORMAL
FLUAV AG NPH QL: NEGATIVE
FLUBV AG NPH QL: NEGATIVE
INTERNAL CONTROL: NORMAL
Lab: NORMAL

## 2019-01-25 PROCEDURE — 99213 OFFICE O/P EST LOW 20 MIN: CPT | Performed by: NURSE PRACTITIONER

## 2019-01-25 PROCEDURE — 87804 INFLUENZA ASSAY W/OPTIC: CPT | Performed by: NURSE PRACTITIONER

## 2019-01-25 RX ORDER — AMOXICILLIN AND CLAVULANATE POTASSIUM 875; 125 MG/1; MG/1
1 TABLET, FILM COATED ORAL 2 TIMES DAILY
Qty: 20 TABLET | Refills: 0 | Status: SHIPPED | OUTPATIENT
Start: 2019-01-25 | End: 2019-02-04

## 2019-01-25 NOTE — PROGRESS NOTES
Subjective   Breanne HILL is a 48 y.o. female.     History of Present Illness   Breanne HILL 48 y.o. female who presents for evaluation of sinus pressure and congestion. Symptoms include congestion, sore throat, nasal blockage, post nasal drip, dry cough and sinus pain.  Onset of symptoms was several days ago, unchanged since that time. Patient denies shortness of breath, wheezing.   Evaluation to date: none Treatment to date:  OTC antihistamines. Has asthma and is concerned that she ends up with bronchitis each times she get sick. Reports she started to have low grade temp at home     The following portions of the patient's history were reviewed and updated as appropriate: allergies, current medications, past family history, past medical history, past social history, past surgical history and problem list.    Review of Systems   Constitutional: Positive for chills and fever.   HENT: Positive for congestion, rhinorrhea, sinus pressure and sore throat. Negative for ear pain.    Respiratory: Positive for cough. Negative for chest tightness.    Musculoskeletal: Positive for myalgias.   Neurological: Positive for headaches.       Objective   Physical Exam   Constitutional: She is oriented to person, place, and time. She appears well-developed and well-nourished.   HENT:   Right Ear: Tympanic membrane, external ear and ear canal normal.   Left Ear: Tympanic membrane, external ear and ear canal normal.   Nose: Mucosal edema and rhinorrhea present. Right sinus exhibits no maxillary sinus tenderness and no frontal sinus tenderness. Left sinus exhibits no maxillary sinus tenderness and no frontal sinus tenderness.   Mouth/Throat: Uvula is midline. Posterior oropharyngeal erythema present.   Cardiovascular: Normal rate and regular rhythm.   Pulmonary/Chest: Effort normal and breath sounds normal.   Neurological: She is alert and oriented to person, place, and time.   Skin: Skin is warm.   Psychiatric: She has a normal  mood and affect. Judgment normal.   Nursing note and vitals reviewed.      Assessment/Plan   Breanne was seen today for cough, nasal congestion, sore throat, fever and headache.    Diagnoses and all orders for this visit:    Acute sinusitis, recurrence not specified, unspecified location  -     amoxicillin-clavulanate (AUGMENTIN) 875-125 MG per tablet; Take 1 tablet by mouth 2 (Two) Times a Day for 10 days.    Flu-like symptoms  -     POCT Influenza A/B

## 2019-02-15 ENCOUNTER — OFFICE VISIT (OUTPATIENT)
Dept: FAMILY MEDICINE CLINIC | Facility: CLINIC | Age: 49
End: 2019-02-15

## 2019-02-15 VITALS
HEART RATE: 66 BPM | DIASTOLIC BLOOD PRESSURE: 60 MMHG | OXYGEN SATURATION: 98 % | TEMPERATURE: 98 F | RESPIRATION RATE: 16 BRPM | HEIGHT: 65 IN | WEIGHT: 176 LBS | SYSTOLIC BLOOD PRESSURE: 100 MMHG | BODY MASS INDEX: 29.32 KG/M2

## 2019-02-15 DIAGNOSIS — Z00.00 LABORATORY EXAM ORDERED AS PART OF ROUTINE GENERAL MEDICAL EXAMINATION: ICD-10-CM

## 2019-02-15 DIAGNOSIS — J06.9 RECURRENT URI (UPPER RESPIRATORY INFECTION): Primary | ICD-10-CM

## 2019-02-15 PROCEDURE — 99213 OFFICE O/P EST LOW 20 MIN: CPT | Performed by: NURSE PRACTITIONER

## 2019-02-15 NOTE — PROGRESS NOTES
Subjective   Breanne HILL is a 48 y.o. female.     History of Present Illness   Patient presents to office to discuss recurrent URIs for the past several months. She has been seen in office a few times since November and has been treated. She states she would get better after treatment for about a week and then symptoms would recur.  She has allergist but has not seen in a year.  She is also due for labs.   The following portions of the patient's history were reviewed and updated as appropriate: allergies, current medications, past family history, past medical history, past social history, past surgical history and problem list.    Review of Systems   Constitutional: Positive for fatigue. Negative for chills and fever.   HENT: Positive for congestion, postnasal drip and voice change. Negative for sore throat and trouble swallowing.    Respiratory: Positive for cough. Negative for chest tightness, shortness of breath and wheezing.        Objective   Physical Exam   Constitutional: She is oriented to person, place, and time. She appears well-developed and well-nourished.   HENT:   Right Ear: Tympanic membrane, external ear and ear canal normal.   Left Ear: Tympanic membrane, external ear and ear canal normal.   Nose: No mucosal edema or rhinorrhea. Right sinus exhibits no maxillary sinus tenderness and no frontal sinus tenderness. Left sinus exhibits no maxillary sinus tenderness and no frontal sinus tenderness.   Mouth/Throat: Uvula is midline. Posterior oropharyngeal erythema present. No oropharyngeal exudate. No tonsillar exudate.   Cardiovascular: Normal rate and regular rhythm.   Pulmonary/Chest: Effort normal and breath sounds normal.   Neurological: She is alert and oriented to person, place, and time.   Skin: Skin is warm.   Psychiatric: She has a normal mood and affect. Judgment normal.   Nursing note and vitals reviewed.      Assessment/Plan   Breanne was seen today for requesting labs, nasal congestion,  headache, cough and fatigue.    Diagnoses and all orders for this visit:    Recurrent URI (upper respiratory infection)    Laboratory exam ordered as part of routine general medical examination  -     Comprehensive metabolic panel  -     Lipid panel  -     CBC and Differential  -     TSH  -     Vitamin D 25 Hydroxy          She will get labs today and call for f/u appt with her allergist.

## 2019-02-16 LAB
25(OH)D3+25(OH)D2 SERPL-MCNC: 39.2 NG/ML (ref 30–100)
ALBUMIN SERPL-MCNC: 4.2 G/DL (ref 3.5–5.5)
ALBUMIN/GLOB SERPL: 1.4 {RATIO} (ref 1.2–2.2)
ALP SERPL-CCNC: 56 IU/L (ref 39–117)
ALT SERPL-CCNC: 19 IU/L (ref 0–32)
AST SERPL-CCNC: 29 IU/L (ref 0–40)
BASOPHILS # BLD AUTO: 0 X10E3/UL (ref 0–0.2)
BASOPHILS NFR BLD AUTO: 1 %
BILIRUB SERPL-MCNC: 0.2 MG/DL (ref 0–1.2)
BUN SERPL-MCNC: 17 MG/DL (ref 6–24)
BUN/CREAT SERPL: 22 (ref 9–23)
CALCIUM SERPL-MCNC: 9.2 MG/DL (ref 8.7–10.2)
CHLORIDE SERPL-SCNC: 103 MMOL/L (ref 96–106)
CHOLEST SERPL-MCNC: 202 MG/DL (ref 100–199)
CO2 SERPL-SCNC: 25 MMOL/L (ref 20–29)
CREAT SERPL-MCNC: 0.78 MG/DL (ref 0.57–1)
EOSINOPHIL # BLD AUTO: 0.1 X10E3/UL (ref 0–0.4)
EOSINOPHIL NFR BLD AUTO: 3 %
ERYTHROCYTE [DISTWIDTH] IN BLOOD BY AUTOMATED COUNT: 13.6 % (ref 12.3–15.4)
GLOBULIN SER CALC-MCNC: 2.9 G/DL (ref 1.5–4.5)
GLUCOSE SERPL-MCNC: 93 MG/DL (ref 65–99)
HCT VFR BLD AUTO: 41.2 % (ref 34–46.6)
HDLC SERPL-MCNC: 92 MG/DL
HGB BLD-MCNC: 13.8 G/DL (ref 11.1–15.9)
IMM GRANULOCYTES # BLD AUTO: 0 X10E3/UL (ref 0–0.1)
IMM GRANULOCYTES NFR BLD AUTO: 0 %
LDLC SERPL CALC-MCNC: 104 MG/DL (ref 0–99)
LYMPHOCYTES # BLD AUTO: 1.4 X10E3/UL (ref 0.7–3.1)
LYMPHOCYTES NFR BLD AUTO: 40 %
MCH RBC QN AUTO: 29.9 PG (ref 26.6–33)
MCHC RBC AUTO-ENTMCNC: 33.5 G/DL (ref 31.5–35.7)
MCV RBC AUTO: 89 FL (ref 79–97)
MONOCYTES # BLD AUTO: 0.3 X10E3/UL (ref 0.1–0.9)
MONOCYTES NFR BLD AUTO: 8 %
NEUTROPHILS # BLD AUTO: 1.7 X10E3/UL (ref 1.4–7)
NEUTROPHILS NFR BLD AUTO: 48 %
PLATELET # BLD AUTO: 367 X10E3/UL (ref 150–379)
POTASSIUM SERPL-SCNC: 4.9 MMOL/L (ref 3.5–5.2)
PROT SERPL-MCNC: 7.1 G/DL (ref 6–8.5)
RBC # BLD AUTO: 4.62 X10E6/UL (ref 3.77–5.28)
SODIUM SERPL-SCNC: 143 MMOL/L (ref 134–144)
TRIGL SERPL-MCNC: 32 MG/DL (ref 0–149)
TSH SERPL DL<=0.005 MIU/L-ACNC: 1.13 UIU/ML (ref 0.45–4.5)
VLDLC SERPL CALC-MCNC: 6 MG/DL (ref 5–40)
WBC # BLD AUTO: 3.6 X10E3/UL (ref 3.4–10.8)

## 2019-06-17 ENCOUNTER — OFFICE VISIT (OUTPATIENT)
Dept: RETAIL CLINIC | Facility: CLINIC | Age: 49
End: 2019-06-17

## 2019-06-17 VITALS
TEMPERATURE: 97.9 F | OXYGEN SATURATION: 100 % | DIASTOLIC BLOOD PRESSURE: 60 MMHG | SYSTOLIC BLOOD PRESSURE: 108 MMHG | RESPIRATION RATE: 18 BRPM | HEART RATE: 80 BPM

## 2019-06-17 DIAGNOSIS — R42 VERTIGO: ICD-10-CM

## 2019-06-17 DIAGNOSIS — H66.001 ACUTE SUPPURATIVE OTITIS MEDIA OF RIGHT EAR WITHOUT SPONTANEOUS RUPTURE OF TYMPANIC MEMBRANE, RECURRENCE NOT SPECIFIED: Primary | ICD-10-CM

## 2019-06-17 PROCEDURE — 99213 OFFICE O/P EST LOW 20 MIN: CPT | Performed by: NURSE PRACTITIONER

## 2019-06-17 RX ORDER — METHYLPREDNISOLONE 4 MG/1
TABLET ORAL
Qty: 21 EACH | Refills: 0 | Status: SHIPPED | OUTPATIENT
Start: 2019-06-17 | End: 2019-09-12

## 2019-06-17 RX ORDER — CEFDINIR 300 MG/1
300 CAPSULE ORAL 2 TIMES DAILY
Qty: 20 CAPSULE | Refills: 0 | Status: SHIPPED | OUTPATIENT
Start: 2019-06-17 | End: 2019-06-27

## 2019-06-17 NOTE — PATIENT INSTRUCTIONS
Otitis Media, Adult  Otitis media occurs when there is inflammation and fluid in the middle ear. Your middle ear is a part of the ear that contains bones for hearing as well as air that helps send sounds to your brain.  What are the causes?  This condition is caused by a blockage in the eustachian tube. This tube drains fluid from the ear to the back of the nose (nasopharynx). A blockage in this tube can be caused by an object or by swelling (edema) in the tube. Problems that can cause a blockage include:  · A cold or other upper respiratory infection.  · Allergies.  · An irritant, such as tobacco smoke.  · Enlarged adenoids. The adenoids are areas of soft tissue located high in the back of the throat, behind the nose and the roof of the mouth.  · A mass in the nasopharynx.  · Damage to the ear caused by pressure changes (barotrauma).    What are the signs or symptoms?  Symptoms of this condition include:  · Ear pain.  · A fever.  · Decreased hearing.  · A headache.  · Tiredness (lethargy).  · Fluid leaking from the ear.  · Ringing in the ear.    How is this diagnosed?  This condition is diagnosed with a physical exam. During the exam your health care provider will use an instrument called an otoscope to look into your ear and check for redness, swelling, and fluid. He or she will also ask about your symptoms.  Your health care provider may also order tests, such as:  · A test to check the movement of the eardrum (pneumatic otoscopy). This test is done by squeezing a small amount of air into the ear.  · A test that changes air pressure in the middle ear to check how well the eardrum moves and whether the eustachian tube is working (tympanogram).    How is this treated?  This condition usually goes away on its own within 3-5 days. But if the condition is caused by a bacteria infection and does not go away own its own, or keeps coming back, your health care provider may:  · Prescribe antibiotic medicines to treat the  infection.  · Prescribe or recommend medicines to control pain.    Follow these instructions at home:  · Take over-the-counter and prescription medicines only as told by your health care provider.  · If you were prescribed an antibiotic medicine, take it as told by your health care provider. Do not stop taking the antibiotic even if you start to feel better.  · Keep all follow-up visits as told by your health care provider. This is important.  Contact a health care provider if:  · You have bleeding from your nose.  · There is a lump on your neck.  · You are not getting better in 5 days.  · You feel worse instead of better.  Get help right away if:  · You have severe pain that is not controlled with medicine.  · You have swelling, redness, or pain around your ear.  · You have stiffness in your neck.  · A part of your face is paralyzed.  · The bone behind your ear (mastoid) is tender when you touch it.  · You develop a severe headache.  Summary  · Otitis media is redness, soreness, and swelling of the middle ear.  · This condition usually goes away on its own within 3-5 days.  · If the problem does not go away in 3-5 days, your health care provider may prescribe or recommend medicines to treat your symptoms.  · If you were prescribed an antibiotic medicine, take it as told by your health care provider.  This information is not intended to replace advice given to you by your health care provider. Make sure you discuss any questions you have with your health care provider.  Document Released: 09/22/2005 Document Revised: 12/08/2017 Document Reviewed: 12/08/2017  [employer] Interactive Patient Education © 2019 [employer]  Vertigo  Vertigo is the feeling that you or your surroundings are moving when they are not. Vertigo can be dangerous if it occurs while you are doing something that could endanger you or others, such as driving.  What are the causes?  This condition is caused by a disturbance in the signals that are  sent by your body’s sensory systems to your brain. Different causes of a disturbance can lead to vertigo, including:  · Infections, especially in the inner ear.  · A bad reaction to a drug, or misuse of alcohol and medicines.  · Withdrawal from drugs or alcohol.  · Quickly changing positions, as when lying down or rolling over in bed.  · Migraine headaches.  · Decreased blood flow to the brain.  · Decreased blood pressure.  · Increased pressure in the brain from a head or neck injury, stroke, infection, tumor, or bleeding.  · Central nervous system disorders.    What are the signs or symptoms?  Symptoms of this condition usually occur when you move your head or your eyes in different directions. Symptoms may start suddenly, and they usually last for less than a minute. Symptoms may include:  · Loss of balance and falling.  · Feeling like you are spinning or moving.  · Feeling like your surroundings are spinning or moving.  · Nausea and vomiting.  · Blurred vision or double vision.  · Difficulty hearing.  · Slurred speech.  · Dizziness.  · Involuntary eye movement (nystagmus).    Symptoms can be mild and cause only slight annoyance, or they can be severe and interfere with daily life. Episodes of vertigo may return (recur) over time, and they are often triggered by certain movements. Symptoms may improve over time.  How is this diagnosed?  This condition may be diagnosed based on medical history and the quality of your nystagmus. Your health care provider may test your eye movements by asking you to quickly change positions to trigger the nystagmus. This may be called the Duson-Hallpike test, head thrust test, or roll test. You may be referred to a health care provider who specializes in ear, nose, and throat (ENT) problems (otolaryngologist) or a provider who specializes in disorders of the central nervous system (neurologist).  You may have additional testing, including:  · A physical exam.  · Blood  tests.  · MRI.  · A CT scan.  · An electrocardiogram (ECG). This records electrical activity in your heart.  · An electroencephalogram (EEG). This records electrical activity in your brain.  · Hearing tests.    How is this treated?  Treatment for this condition depends on the cause and the severity of the symptoms. Treatment options include:  · Medicines to treat nausea or vertigo. These are usually used for severe cases. Some medicines that are used to treat other conditions may also reduce or eliminate vertigo symptoms. These include:  ? Medicines that control allergies (antihistamines).  ? Medicines that control seizures (anticonvulsants).  ? Medicines that relieve depression (antidepressants).  ? Medicines that relieve anxiety (sedatives).  · Head movements to adjust your inner ear back to normal. If your vertigo is caused by an ear problem, your health care provider may recommend certain movements to correct the problem.  · Surgery. This is rare.    Follow these instructions at home:  Safety  · Move slowly.Avoid sudden body or head movements.  · Avoid driving.  · Avoid operating heavy machinery.  · Avoid doing any tasks that would cause danger to you or others if you would have a vertigo episode during the task.  · If you have trouble walking or keeping your balance, try using a cane for stability. If you feel dizzy or unstable, sit down right away.  · Return to your normal activities as told by your health care provider. Ask your health care provider what activities are safe for you.  General instructions  · Take over-the-counter and prescription medicines only as told by your health care provider.  · Avoid certain positions or movements as told by your health care provider.  · Drink enough fluid to keep your urine clear or pale yellow.  · Keep all follow-up visits as told by your health care provider. This is important.  Contact a health care provider if:  · Your medicines do not relieve your vertigo or they  make it worse.  · You have a fever.  · Your condition gets worse or you develop new symptoms.  · Your family or friends notice any behavioral changes.  · Your nausea or vomiting gets worse.  · You have numbness or a “pins and needles” sensation in part of your body.  Get help right away if:  · You have difficulty moving or speaking.  · You are always dizzy.  · You faint.  · You develop severe headaches.  · You have weakness in your hands, arms, or legs.  · You have changes in your hearing or vision.  · You develop a stiff neck.  · You develop sensitivity to light.  This information is not intended to replace advice given to you by your health care provider. Make sure you discuss any questions you have with your health care provider.  Document Released: 09/27/2006 Document Revised: 05/31/2017 Document Reviewed: 04/11/2016  [employer] Interactive Patient Education © 2019 [employer]

## 2019-09-12 ENCOUNTER — OFFICE VISIT (OUTPATIENT)
Dept: FAMILY MEDICINE CLINIC | Facility: CLINIC | Age: 49
End: 2019-09-12

## 2019-09-12 VITALS
SYSTOLIC BLOOD PRESSURE: 116 MMHG | HEIGHT: 65 IN | RESPIRATION RATE: 16 BRPM | HEART RATE: 74 BPM | DIASTOLIC BLOOD PRESSURE: 78 MMHG | BODY MASS INDEX: 30.99 KG/M2 | WEIGHT: 186 LBS | TEMPERATURE: 98.3 F

## 2019-09-12 DIAGNOSIS — M54.50 LUMBAR BACK PAIN: Primary | ICD-10-CM

## 2019-09-12 PROCEDURE — 99213 OFFICE O/P EST LOW 20 MIN: CPT | Performed by: FAMILY MEDICINE

## 2019-09-12 RX ORDER — CYCLOBENZAPRINE HCL 10 MG
10 TABLET ORAL 2 TIMES DAILY PRN
Qty: 60 TABLET | Refills: 2 | OUTPATIENT
Start: 2019-09-12 | End: 2020-10-27

## 2019-09-12 RX ORDER — METHYLPREDNISOLONE 4 MG/1
TABLET ORAL
Qty: 21 TABLET | Refills: 0 | OUTPATIENT
Start: 2019-09-12 | End: 2020-10-27

## 2019-09-12 NOTE — PROGRESS NOTES
"Subjective   Breanne HILL is a 49 y.o. female.     CC: Back Pain    History of Present Illness     Pt comes in today c/o some LBP for approximately 4 weeks w/o reason. Pt simply went to bed and awakened with the pain. Worse with physical activity. Pt works at [employer] for 25 years but denies work injury. No radiation of the pain. Hurts mainly in the middle. Hurts more when standing upright. \"Burns\" and \"stings\".   Tried 800mg Advil w/o help.     The following portions of the patient's history were reviewed and updated as appropriate: allergies, current medications, past family history, past medical history, past social history, past surgical history and problem list.    Review of Systems   Constitutional: Negative for activity change, chills, fatigue and fever.   Respiratory: Negative for cough and chest tightness.    Cardiovascular: Negative for chest pain and palpitations.   Gastrointestinal: Negative for abdominal pain and nausea.   Endocrine: Negative for cold intolerance.   Musculoskeletal: Positive for back pain.   Neurological: Negative for numbness.   Psychiatric/Behavioral: Negative for behavioral problems and dysphoric mood.       /78   Pulse 74   Temp 98.3 °F (36.8 °C) (Oral)   Resp 16   Ht 165.1 cm (65\")   Wt 84.4 kg (186 lb)   BMI 30.95 kg/m²     Objective   Physical Exam   Constitutional: She appears well-developed and well-nourished.   Neck: Neck supple. No thyromegaly present.   Cardiovascular: Normal rate and regular rhythm.   No murmur heard.  Pulmonary/Chest: Effort normal and breath sounds normal.   Abdominal: Bowel sounds are normal. There is no tenderness.   Musculoskeletal: She exhibits tenderness (mid-lumbar midline with pain with extention, rightward rotation, and tilt.).   Neurological: She is alert. She displays normal reflexes.   Psychiatric: She has a normal mood and affect. Her behavior is normal.   Nursing note and vitals reviewed.  Sofiya present for " exam.    Assessment/Plan   Breanne was seen today for lower back pain.    Diagnoses and all orders for this visit:    Lumbar back pain  -     Ambulatory Referral to Physical Therapy Evaluate and treat  -     methylPREDNISolone (MEDROL) 4 MG tablet; follow package directions  -     cyclobenzaprine (FLEXERIL) 10 MG tablet; Take 1 tablet by mouth 2 (Two) Times a Day As Needed for Muscle Spasms.  -     diclofenac (VOLTAREN) 50 MG EC tablet; Take 1 tablet by mouth 3 (Three) Times a Day.

## 2021-02-25 ENCOUNTER — OFFICE VISIT (OUTPATIENT)
Dept: FAMILY MEDICINE CLINIC | Facility: CLINIC | Age: 51
End: 2021-02-25

## 2021-02-25 VITALS
RESPIRATION RATE: 16 BRPM | SYSTOLIC BLOOD PRESSURE: 116 MMHG | WEIGHT: 205 LBS | BODY MASS INDEX: 34.16 KG/M2 | DIASTOLIC BLOOD PRESSURE: 80 MMHG | HEART RATE: 82 BPM | TEMPERATURE: 96.5 F | HEIGHT: 65 IN

## 2021-02-25 DIAGNOSIS — M79.671 PAIN IN BOTH FEET: ICD-10-CM

## 2021-02-25 DIAGNOSIS — M25.50 ARTHRALGIA, UNSPECIFIED JOINT: Primary | ICD-10-CM

## 2021-02-25 DIAGNOSIS — M79.672 PAIN IN BOTH FEET: ICD-10-CM

## 2021-02-25 DIAGNOSIS — Z00.00 ANNUAL PHYSICAL EXAM: ICD-10-CM

## 2021-02-25 DIAGNOSIS — Z12.11 SCREENING FOR COLON CANCER: ICD-10-CM

## 2021-02-25 PROCEDURE — 99214 OFFICE O/P EST MOD 30 MIN: CPT | Performed by: FAMILY MEDICINE

## 2021-02-25 RX ORDER — MELOXICAM 15 MG/1
15 TABLET ORAL DAILY
Qty: 90 TABLET | Refills: 1 | Status: SHIPPED | OUTPATIENT
Start: 2021-02-25 | End: 2021-09-21

## 2021-02-25 RX ORDER — AZELASTINE 1 MG/ML
SPRAY, METERED NASAL
COMMUNITY
Start: 2021-01-25 | End: 2021-12-28

## 2021-02-25 NOTE — PROGRESS NOTES
Subjective   Breanne HILL is a 50 y.o. female.     CC: Joint Pain    History of Present Illness     Pt comes in today c/o joint aches of multiple joints (no swelling) for the past 6 weeks or so. Pt has worked at [employer] for 26 years and also has been through a divorce this past year (increased stressed). No FH of rheumatologic disease. Tried Advil with only minimal result.   Lots of chronic foot pain issues.    The following portions of the patient's history were reviewed and updated as appropriate: allergies, current medications, past family history, past medical history, past social history, past surgical history and problem list.    Review of Systems   Constitutional: Negative for activity change, chills and fever.   Respiratory: Negative for cough.    Cardiovascular: Negative for chest pain.   Musculoskeletal: Positive for arthralgias.   Psychiatric/Behavioral: Negative for dysphoric mood.       Objective   Physical Exam  Constitutional:       General: She is not in acute distress.     Appearance: She is well-developed.   Pulmonary:      Effort: Pulmonary effort is normal.   Neurological:      Mental Status: She is alert and oriented to person, place, and time.   Psychiatric:         Behavior: Behavior normal.         Thought Content: Thought content normal.         Assessment/Plan   Diagnoses and all orders for this visit:    1. Arthralgia, unspecified joint (Primary)  -     Uric Acid  -     Rheumatoid Factor  -     ONI  -     meloxicam (MOBIC) 15 MG tablet; Take 1 tablet by mouth Daily.  Dispense: 90 tablet; Refill: 1    2. Annual physical exam  Comments:  for labs only, don't bill  Orders:  -     Comprehensive metabolic panel  -     Lipid panel  -     CBC and Differential  -     TSH    3. Screening for colon cancer  -     Ambulatory Referral to Gastroenterology    4. Pain in both feet  -     Ambulatory Referral to Podiatry

## 2021-02-26 ENCOUNTER — TELEPHONE (OUTPATIENT)
Dept: GASTROENTEROLOGY | Facility: CLINIC | Age: 51
End: 2021-02-26

## 2021-02-26 LAB
ALBUMIN SERPL-MCNC: 4.6 G/DL (ref 3.5–5.2)
ALBUMIN/GLOB SERPL: 1.8 G/DL
ALP SERPL-CCNC: 89 U/L (ref 39–117)
ALT SERPL-CCNC: 25 U/L (ref 1–33)
ANA SER QL: NEGATIVE
AST SERPL-CCNC: 31 U/L (ref 1–32)
BASOPHILS # BLD AUTO: 0.03 10*3/MM3 (ref 0–0.2)
BASOPHILS NFR BLD AUTO: 0.7 % (ref 0–1.5)
BILIRUB SERPL-MCNC: 0.3 MG/DL (ref 0–1.2)
BUN SERPL-MCNC: 21 MG/DL (ref 6–20)
BUN/CREAT SERPL: 29.2 (ref 7–25)
CALCIUM SERPL-MCNC: 9.6 MG/DL (ref 8.6–10.5)
CHLORIDE SERPL-SCNC: 104 MMOL/L (ref 98–107)
CHOLEST SERPL-MCNC: 211 MG/DL (ref 0–200)
CO2 SERPL-SCNC: 25.1 MMOL/L (ref 22–29)
CREAT SERPL-MCNC: 0.72 MG/DL (ref 0.57–1)
EOSINOPHIL # BLD AUTO: 0.15 10*3/MM3 (ref 0–0.4)
EOSINOPHIL NFR BLD AUTO: 3.4 % (ref 0.3–6.2)
ERYTHROCYTE [DISTWIDTH] IN BLOOD BY AUTOMATED COUNT: 12.3 % (ref 12.3–15.4)
GLOBULIN SER CALC-MCNC: 2.6 GM/DL
GLUCOSE SERPL-MCNC: 86 MG/DL (ref 65–99)
HCT VFR BLD AUTO: 41.7 % (ref 34–46.6)
HDLC SERPL-MCNC: 92 MG/DL (ref 40–60)
HGB BLD-MCNC: 14 G/DL (ref 12–15.9)
IMM GRANULOCYTES # BLD AUTO: 0.01 10*3/MM3 (ref 0–0.05)
IMM GRANULOCYTES NFR BLD AUTO: 0.2 % (ref 0–0.5)
LDLC SERPL CALC-MCNC: 113 MG/DL (ref 0–100)
LYMPHOCYTES # BLD AUTO: 1.9 10*3/MM3 (ref 0.7–3.1)
LYMPHOCYTES NFR BLD AUTO: 43.4 % (ref 19.6–45.3)
MCH RBC QN AUTO: 28.9 PG (ref 26.6–33)
MCHC RBC AUTO-ENTMCNC: 33.6 G/DL (ref 31.5–35.7)
MCV RBC AUTO: 86.2 FL (ref 79–97)
MONOCYTES # BLD AUTO: 0.44 10*3/MM3 (ref 0.1–0.9)
MONOCYTES NFR BLD AUTO: 10 % (ref 5–12)
NEUTROPHILS # BLD AUTO: 1.85 10*3/MM3 (ref 1.7–7)
NEUTROPHILS NFR BLD AUTO: 42.3 % (ref 42.7–76)
NRBC BLD AUTO-RTO: 0 /100 WBC (ref 0–0.2)
PLATELET # BLD AUTO: 322 10*3/MM3 (ref 140–450)
POTASSIUM SERPL-SCNC: 4.8 MMOL/L (ref 3.5–5.2)
PROT SERPL-MCNC: 7.2 G/DL (ref 6–8.5)
RBC # BLD AUTO: 4.84 10*6/MM3 (ref 3.77–5.28)
RHEUMATOID FACT SERPL-ACNC: <10 IU/ML (ref 0–13.9)
SODIUM SERPL-SCNC: 139 MMOL/L (ref 136–145)
TRIGL SERPL-MCNC: 31 MG/DL (ref 0–150)
TSH SERPL DL<=0.005 MIU/L-ACNC: 1.07 UIU/ML (ref 0.27–4.2)
URATE SERPL-MCNC: 4 MG/DL (ref 2.4–5.7)
VLDLC SERPL CALC-MCNC: 6 MG/DL (ref 5–40)
WBC # BLD AUTO: 4.38 10*3/MM3 (ref 3.4–10.8)

## 2021-08-03 ENCOUNTER — APPOINTMENT (OUTPATIENT)
Dept: WOMENS IMAGING | Facility: HOSPITAL | Age: 51
End: 2021-08-03

## 2021-08-03 PROCEDURE — 77063 BREAST TOMOSYNTHESIS BI: CPT | Performed by: RADIOLOGY

## 2021-08-03 PROCEDURE — 77067 SCR MAMMO BI INCL CAD: CPT | Performed by: RADIOLOGY

## 2021-08-23 ENCOUNTER — TELEPHONE (OUTPATIENT)
Dept: FAMILY MEDICINE CLINIC | Facility: CLINIC | Age: 51
End: 2021-08-23

## 2021-08-23 NOTE — TELEPHONE ENCOUNTER
Caller: Breanne HILL    Relationship: Self    Best call back number: [phone]     What was the call regarding: THE PATIENT STATES THAT SHE HAS BEEN TAKING A DIET PILL AND THINKS THAT IT MAY HAVE NEGATIVELY AFFECTED HER LIVER. THE PATIENT STATES THAT SHE HAS A HISTORY OF LIVER ISSUES AND THIS IS VERY SIMILAR. THE PATIENT STATES THAT SHE WAS HAVING THE SHARP PAIN ON HER SIDES AND BACK. THE PATIENT STATES THAT SHE HAS ATTEMPTED TO DRINK PLENTY OF LIQUIDS AND WATER BUT IT IS NOT WORKING DUE TO HER STOMACH FEELING REALLY FULL ALL THE TIME.     Do you require a callback: YES

## 2021-08-25 ENCOUNTER — OFFICE VISIT (OUTPATIENT)
Dept: FAMILY MEDICINE CLINIC | Facility: CLINIC | Age: 51
End: 2021-08-25

## 2021-08-25 VITALS
DIASTOLIC BLOOD PRESSURE: 90 MMHG | HEART RATE: 79 BPM | TEMPERATURE: 97.5 F | OXYGEN SATURATION: 98 % | BODY MASS INDEX: 33.15 KG/M2 | RESPIRATION RATE: 16 BRPM | SYSTOLIC BLOOD PRESSURE: 134 MMHG | WEIGHT: 199 LBS | HEIGHT: 65 IN

## 2021-08-25 DIAGNOSIS — K29.00 OTHER ACUTE GASTRITIS WITHOUT HEMORRHAGE: Primary | ICD-10-CM

## 2021-08-25 PROCEDURE — 99213 OFFICE O/P EST LOW 20 MIN: CPT | Performed by: NURSE PRACTITIONER

## 2021-08-25 RX ORDER — OMEPRAZOLE 40 MG/1
40 CAPSULE, DELAYED RELEASE ORAL DAILY
Qty: 30 CAPSULE | Refills: 1 | Status: SHIPPED | OUTPATIENT
Start: 2021-08-25 | End: 2021-10-27

## 2021-08-25 NOTE — PROGRESS NOTES
Subjective   Breanne HILL is a 51 y.o. female.     History of Present Illness   Answers for HPI/ROS submitted by the patient on 8/24/2021  What is the primary reason for your visit?: Abdominal Pain    Breanne HILL 51 y.o. female who presents for evaluation of nausea and abdominal pain. Symptoms have been present for several days .  The condition is aggravated by eating spicy foods and drinking coffee . she is experiencing mid upper abdomen pain and nausea.  Alleviating factors are not eating with some help, but still symptoms . Patient denies fever, chills, diarrhea, constipation, GI symptoms waking them up, dyschezia, melena, reflux and vomiting her past medical history is notable for no prior issures.  Patient denies recent travel.          The following portions of the patient's history were reviewed and updated as appropriate: allergies, current medications, past family history, past medical history, past social history, past surgical history and problem list.    Review of Systems   Constitutional: Negative for fever.   Gastrointestinal: Positive for abdominal pain and nausea. Negative for constipation, diarrhea and vomiting.   Genitourinary: Negative for dysuria, frequency and hematuria.   Musculoskeletal: Negative for arthralgias and myalgias.   Neurological: Negative for headaches.       Objective   Physical Exam  Vitals and nursing note reviewed.   Constitutional:       Appearance: Normal appearance. She is well-developed.   Neck:      Vascular: No carotid bruit.   Cardiovascular:      Rate and Rhythm: Normal rate and regular rhythm.   Pulmonary:      Effort: Pulmonary effort is normal.      Breath sounds: Normal breath sounds.   Neurological:      Mental Status: She is alert and oriented to person, place, and time.   Psychiatric:         Mood and Affect: Mood normal.         Behavior: Behavior normal.         Thought Content: Thought content normal.         Judgment: Judgment normal.          Assessment/Plan   Diagnoses and all orders for this visit:    1. Other acute gastritis without hemorrhage (Primary)  -     omeprazole (priLOSEC) 40 MG capsule; Take 1 capsule by mouth Daily.  Dispense: 30 capsule; Refill: 1

## 2021-09-21 DIAGNOSIS — M25.50 ARTHRALGIA, UNSPECIFIED JOINT: ICD-10-CM

## 2021-09-21 RX ORDER — MELOXICAM 15 MG/1
TABLET ORAL
Qty: 30 TABLET | Refills: 0 | Status: SHIPPED | OUTPATIENT
Start: 2021-09-21 | End: 2022-04-07

## 2021-10-27 DIAGNOSIS — K29.00 OTHER ACUTE GASTRITIS WITHOUT HEMORRHAGE: ICD-10-CM

## 2021-10-27 RX ORDER — OMEPRAZOLE 40 MG/1
CAPSULE, DELAYED RELEASE ORAL
Qty: 30 CAPSULE | Refills: 1 | Status: SHIPPED | OUTPATIENT
Start: 2021-10-27 | End: 2022-01-07

## 2022-01-07 DIAGNOSIS — K29.00 OTHER ACUTE GASTRITIS WITHOUT HEMORRHAGE: ICD-10-CM

## 2022-01-07 RX ORDER — OMEPRAZOLE 40 MG/1
CAPSULE, DELAYED RELEASE ORAL
Qty: 30 CAPSULE | Refills: 1 | Status: SHIPPED | OUTPATIENT
Start: 2022-01-07 | End: 2022-03-04

## 2022-03-04 DIAGNOSIS — K29.00 OTHER ACUTE GASTRITIS WITHOUT HEMORRHAGE: ICD-10-CM

## 2022-03-04 RX ORDER — OMEPRAZOLE 40 MG/1
CAPSULE, DELAYED RELEASE ORAL
Qty: 30 CAPSULE | Refills: 0 | Status: SHIPPED | OUTPATIENT
Start: 2022-03-04 | End: 2022-04-07

## 2022-03-31 DIAGNOSIS — K29.00 OTHER ACUTE GASTRITIS WITHOUT HEMORRHAGE: ICD-10-CM

## 2022-03-31 RX ORDER — OMEPRAZOLE 40 MG/1
CAPSULE, DELAYED RELEASE ORAL
Qty: 30 CAPSULE | Refills: 0 | OUTPATIENT
Start: 2022-03-31

## 2022-04-07 ENCOUNTER — OFFICE VISIT (OUTPATIENT)
Dept: FAMILY MEDICINE CLINIC | Facility: CLINIC | Age: 52
End: 2022-04-07

## 2022-04-07 VITALS
HEIGHT: 65 IN | BODY MASS INDEX: 32.15 KG/M2 | RESPIRATION RATE: 16 BRPM | WEIGHT: 193 LBS | TEMPERATURE: 97.4 F | HEART RATE: 84 BPM | DIASTOLIC BLOOD PRESSURE: 79 MMHG | SYSTOLIC BLOOD PRESSURE: 122 MMHG

## 2022-04-07 DIAGNOSIS — Z12.11 SCREENING FOR COLON CANCER: ICD-10-CM

## 2022-04-07 DIAGNOSIS — K29.00 ACUTE GASTRITIS WITHOUT HEMORRHAGE, UNSPECIFIED GASTRITIS TYPE: Primary | ICD-10-CM

## 2022-04-07 PROCEDURE — 99213 OFFICE O/P EST LOW 20 MIN: CPT | Performed by: FAMILY MEDICINE

## 2022-04-07 RX ORDER — PANTOPRAZOLE SODIUM 40 MG/1
40 TABLET, DELAYED RELEASE ORAL DAILY
Qty: 90 TABLET | Refills: 3 | Status: SHIPPED | OUTPATIENT
Start: 2022-04-07 | End: 2022-04-29 | Stop reason: SDUPTHER

## 2022-04-07 RX ORDER — OMEPRAZOLE 40 MG/1
40 CAPSULE, DELAYED RELEASE ORAL DAILY
Qty: 90 CAPSULE | Refills: 3 | Status: CANCELLED | OUTPATIENT
Start: 2022-04-07

## 2022-04-07 RX ORDER — SUCRALFATE 1 G/1
1 TABLET ORAL 4 TIMES DAILY
Qty: 40 TABLET | Refills: 1 | Status: SHIPPED | OUTPATIENT
Start: 2022-04-07 | End: 2022-04-29 | Stop reason: SDUPTHER

## 2022-04-07 RX ORDER — BUPROPION HYDROCHLORIDE 300 MG/1
300 TABLET ORAL DAILY
COMMUNITY
Start: 2022-03-25 | End: 2022-06-29

## 2022-04-07 RX ORDER — TOPIRAMATE 50 MG/1
50 TABLET, FILM COATED ORAL 2 TIMES DAILY
COMMUNITY
Start: 2022-03-25 | End: 2022-06-29

## 2022-04-07 RX ORDER — ESTRADIOL 0.05 MG/D
FILM, EXTENDED RELEASE TRANSDERMAL
COMMUNITY
Start: 2022-04-05

## 2022-04-07 RX ORDER — PROGESTERONE 100 MG/1
CAPSULE ORAL NIGHTLY
COMMUNITY
Start: 2022-04-05

## 2022-04-07 RX ORDER — PHENTERMINE HYDROCHLORIDE 37.5 MG/1
37.5 TABLET ORAL EVERY MORNING
COMMUNITY
Start: 2022-03-25 | End: 2022-06-29

## 2022-04-07 NOTE — PROGRESS NOTES
Subjective   Breanne HILL is a 51 y.o. female.     CC: Abdominal Pain    History of Present Illness     Pt with prior h/o gastritis comes in today reporting LUQ discomfort for the past week or so. Pt reports gradually improving. Pt reports was awakened Monday AM about 3am and reports felt like her prior gastritis. Pt started back on Prilosec after that with some improvement, pain persists. No black or tarry stools. Eating makes it hurt more.     Pt had been referred to GI a year ago for colon screening but hasn't been yet.    The following portions of the patient's history were reviewed and updated as appropriate: allergies, current medications, past family history, past medical history, past social history, past surgical history and problem list.    Review of Systems   Constitutional: Negative for fever.   Gastrointestinal: Positive for abdominal pain. Negative for constipation, diarrhea, nausea and vomiting.   Genitourinary: Negative for dysuria, frequency and hematuria.   Musculoskeletal: Negative for arthralgias and myalgias.   Neurological: Positive for headaches.       Objective   Physical Exam  Exam conducted with a chaperone present.   Constitutional:       General: She is not in acute distress.     Appearance: She is well-developed.   Pulmonary:      Effort: Pulmonary effort is normal.   Abdominal:      General: Abdomen is flat.      Palpations: Abdomen is soft.      Tenderness: There is abdominal tenderness (epigastrium).      Hernia: No hernia is present.   Neurological:      Mental Status: She is alert and oriented to person, place, and time.   Psychiatric:         Behavior: Behavior normal.         Thought Content: Thought content normal.     Sofiya present for exam.    Assessment/Plan   Diagnoses and all orders for this visit:    1. Acute gastritis without hemorrhage, unspecified gastritis type (Primary)  -     Ambulatory Referral to Gastroenterology  -     sucralfate (Carafate) 1 g tablet; Take 1  tablet by mouth 4 (Four) Times a Day.  Dispense: 40 tablet; Refill: 1  -     pantoprazole (PROTONIX) 40 MG EC tablet; Take 1 tablet by mouth Daily.  Dispense: 90 tablet; Refill: 3    2. Screening for colon cancer    Murray diet and hydration discussed.

## 2022-04-11 ENCOUNTER — PRE-PROCEDURE SCREENING (OUTPATIENT)
Dept: GASTROENTEROLOGY | Facility: CLINIC | Age: 52
End: 2022-04-11

## 2022-04-12 ENCOUNTER — TELEPHONE (OUTPATIENT)
Dept: FAMILY MEDICINE CLINIC | Facility: CLINIC | Age: 52
End: 2022-04-12

## 2022-04-12 DIAGNOSIS — Z00.00 ANNUAL PHYSICAL EXAM: Primary | ICD-10-CM

## 2022-04-12 NOTE — TELEPHONE ENCOUNTER
Caller: Breanne HILL    Relationship: Self    Best call back number: [phone]     What orders are you requesting (i.e. lab or imaging): LABS    In what timeframe would the patient need to come in: ANY TIME THIS WEEK     Where will you receive your lab/imaging services: [facility] IN THE SAME BUILDING     Additional notes: PLEASE NOTIFY PATIENT WHEN ORDERS ARE PUT IN.

## 2022-04-16 LAB
ALBUMIN SERPL-MCNC: 4.3 G/DL (ref 3.8–4.9)
ALBUMIN/GLOB SERPL: 1.7 {RATIO} (ref 1.2–2.2)
ALP SERPL-CCNC: 141 IU/L (ref 44–121)
ALT SERPL-CCNC: 20 IU/L (ref 0–32)
AST SERPL-CCNC: 24 IU/L (ref 0–40)
BASOPHILS # BLD AUTO: 0 X10E3/UL (ref 0–0.2)
BASOPHILS NFR BLD AUTO: 1 %
BILIRUB SERPL-MCNC: <0.2 MG/DL (ref 0–1.2)
BUN SERPL-MCNC: 25 MG/DL (ref 6–24)
BUN/CREAT SERPL: 34 (ref 9–23)
CALCIUM SERPL-MCNC: 9.2 MG/DL (ref 8.7–10.2)
CHLORIDE SERPL-SCNC: 102 MMOL/L (ref 96–106)
CHOLEST SERPL-MCNC: 185 MG/DL (ref 100–199)
CO2 SERPL-SCNC: 23 MMOL/L (ref 20–29)
CREAT SERPL-MCNC: 0.73 MG/DL (ref 0.57–1)
EGFRCR SERPLBLD CKD-EPI 2021: 100 ML/MIN/1.73
EOSINOPHIL # BLD AUTO: 0.2 X10E3/UL (ref 0–0.4)
EOSINOPHIL NFR BLD AUTO: 3 %
ERYTHROCYTE [DISTWIDTH] IN BLOOD BY AUTOMATED COUNT: 13 % (ref 11.7–15.4)
GLOBULIN SER CALC-MCNC: 2.5 G/DL (ref 1.5–4.5)
GLUCOSE SERPL-MCNC: 80 MG/DL (ref 65–99)
HCT VFR BLD AUTO: 40.9 % (ref 34–46.6)
HDLC SERPL-MCNC: 72 MG/DL
HGB BLD-MCNC: 13.2 G/DL (ref 11.1–15.9)
IMM GRANULOCYTES # BLD AUTO: 0 X10E3/UL (ref 0–0.1)
IMM GRANULOCYTES NFR BLD AUTO: 0 %
LDLC SERPL CALC-MCNC: 103 MG/DL (ref 0–99)
LYMPHOCYTES # BLD AUTO: 2.2 X10E3/UL (ref 0.7–3.1)
LYMPHOCYTES NFR BLD AUTO: 51 %
MCH RBC QN AUTO: 28.4 PG (ref 26.6–33)
MCHC RBC AUTO-ENTMCNC: 32.3 G/DL (ref 31.5–35.7)
MCV RBC AUTO: 88 FL (ref 79–97)
MONOCYTES # BLD AUTO: 0.5 X10E3/UL (ref 0.1–0.9)
MONOCYTES NFR BLD AUTO: 12 %
NEUTROPHILS # BLD AUTO: 1.5 X10E3/UL (ref 1.4–7)
NEUTROPHILS NFR BLD AUTO: 33 %
PLATELET # BLD AUTO: 353 X10E3/UL (ref 150–450)
POTASSIUM SERPL-SCNC: 3.8 MMOL/L (ref 3.5–5.2)
PROT SERPL-MCNC: 6.8 G/DL (ref 6–8.5)
RBC # BLD AUTO: 4.65 X10E6/UL (ref 3.77–5.28)
SODIUM SERPL-SCNC: 140 MMOL/L (ref 134–144)
TRIGL SERPL-MCNC: 51 MG/DL (ref 0–149)
TSH SERPL DL<=0.005 MIU/L-ACNC: 2.02 UIU/ML (ref 0.45–4.5)
VLDLC SERPL CALC-MCNC: 10 MG/DL (ref 5–40)
WBC # BLD AUTO: 4.4 X10E3/UL (ref 3.4–10.8)

## 2022-04-21 ENCOUNTER — APPOINTMENT (OUTPATIENT)
Dept: CT IMAGING | Facility: HOSPITAL | Age: 52
End: 2022-04-21

## 2022-04-21 DIAGNOSIS — R10.12 LEFT UPPER QUADRANT ABDOMINAL PAIN: ICD-10-CM

## 2022-04-21 DIAGNOSIS — K29.00 ACUTE GASTRITIS WITHOUT HEMORRHAGE, UNSPECIFIED GASTRITIS TYPE: Primary | ICD-10-CM

## 2022-04-22 ENCOUNTER — HOSPITAL ENCOUNTER (OUTPATIENT)
Dept: CT IMAGING | Facility: HOSPITAL | Age: 52
Discharge: HOME | End: 2022-04-22
Admitting: FAMILY MEDICINE

## 2022-04-22 DIAGNOSIS — K29.00 ACUTE GASTRITIS WITHOUT HEMORRHAGE, UNSPECIFIED GASTRITIS TYPE: ICD-10-CM

## 2022-04-22 DIAGNOSIS — R10.12 LEFT UPPER QUADRANT ABDOMINAL PAIN: ICD-10-CM

## 2022-04-22 PROCEDURE — 74150 CT ABDOMEN W/O CONTRAST: CPT

## 2022-04-23 DIAGNOSIS — K29.00 ACUTE GASTRITIS WITHOUT HEMORRHAGE, UNSPECIFIED GASTRITIS TYPE: ICD-10-CM

## 2022-04-25 RX ORDER — SUCRALFATE 1 G/1
TABLET ORAL
Qty: 40 TABLET | Refills: 1 | OUTPATIENT
Start: 2022-04-25

## 2022-04-29 ENCOUNTER — OFFICE VISIT (OUTPATIENT)
Dept: GASTROENTEROLOGY | Facility: CLINIC | Age: 52
End: 2022-04-29

## 2022-04-29 ENCOUNTER — LAB (OUTPATIENT)
Dept: LAB | Facility: HOSPITAL | Age: 52
End: 2022-04-29

## 2022-04-29 VITALS
DIASTOLIC BLOOD PRESSURE: 80 MMHG | BODY MASS INDEX: 32.26 KG/M2 | OXYGEN SATURATION: 98 % | TEMPERATURE: 97.1 F | HEIGHT: 65 IN | SYSTOLIC BLOOD PRESSURE: 135 MMHG | HEART RATE: 82 BPM | WEIGHT: 193.6 LBS

## 2022-04-29 DIAGNOSIS — R10.10 PAIN OF UPPER ABDOMEN: Primary | ICD-10-CM

## 2022-04-29 DIAGNOSIS — K29.00 ACUTE GASTRITIS WITHOUT HEMORRHAGE, UNSPECIFIED GASTRITIS TYPE: ICD-10-CM

## 2022-04-29 DIAGNOSIS — R10.13 DYSPEPSIA: ICD-10-CM

## 2022-04-29 DIAGNOSIS — R74.8 ELEVATED ALKALINE PHOSPHATASE LEVEL: ICD-10-CM

## 2022-04-29 LAB
ALBUMIN SERPL-MCNC: 4.6 G/DL (ref 3.5–5.2)
ALBUMIN/GLOB SERPL: 1.5 G/DL
ALP SERPL-CCNC: 108 U/L (ref 39–117)
ALT SERPL W P-5'-P-CCNC: 22 U/L (ref 1–33)
AMYLASE SERPL-CCNC: 103 U/L (ref 28–100)
ANION GAP SERPL CALCULATED.3IONS-SCNC: 13.7 MMOL/L (ref 5–15)
AST SERPL-CCNC: 23 U/L (ref 1–32)
BILIRUB SERPL-MCNC: 0.3 MG/DL (ref 0–1.2)
BUN SERPL-MCNC: 26 MG/DL (ref 6–20)
BUN/CREAT SERPL: 37.7 (ref 7–25)
CALCIUM SPEC-SCNC: 9.4 MG/DL (ref 8.6–10.5)
CHLORIDE SERPL-SCNC: 103 MMOL/L (ref 98–107)
CO2 SERPL-SCNC: 23.3 MMOL/L (ref 22–29)
CREAT SERPL-MCNC: 0.69 MG/DL (ref 0.57–1)
EGFRCR SERPLBLD CKD-EPI 2021: 105.2 ML/MIN/1.73
GLOBULIN UR ELPH-MCNC: 3 GM/DL
GLUCOSE SERPL-MCNC: 88 MG/DL (ref 65–99)
LIPASE SERPL-CCNC: 41 U/L (ref 13–60)
POTASSIUM SERPL-SCNC: 3.5 MMOL/L (ref 3.5–5.2)
PROT SERPL-MCNC: 7.6 G/DL (ref 6–8.5)
SODIUM SERPL-SCNC: 140 MMOL/L (ref 136–145)

## 2022-04-29 PROCEDURE — 82150 ASSAY OF AMYLASE: CPT | Performed by: NURSE PRACTITIONER

## 2022-04-29 PROCEDURE — 80053 COMPREHEN METABOLIC PANEL: CPT | Performed by: NURSE PRACTITIONER

## 2022-04-29 PROCEDURE — 81382 HLA II TYPING 1 LOC HR: CPT | Performed by: NURSE PRACTITIONER

## 2022-04-29 PROCEDURE — 86258 DGP ANTIBODY EACH IG CLASS: CPT | Performed by: NURSE PRACTITIONER

## 2022-04-29 PROCEDURE — 99214 OFFICE O/P EST MOD 30 MIN: CPT | Performed by: NURSE PRACTITIONER

## 2022-04-29 PROCEDURE — 86381 MITOCHONDRIAL ANTIBODY EACH: CPT | Performed by: NURSE PRACTITIONER

## 2022-04-29 PROCEDURE — 84075 ASSAY ALKALINE PHOSPHATASE: CPT | Performed by: NURSE PRACTITIONER

## 2022-04-29 PROCEDURE — 84080 ASSAY ALKALINE PHOSPHATASES: CPT | Performed by: NURSE PRACTITIONER

## 2022-04-29 PROCEDURE — 36415 COLL VENOUS BLD VENIPUNCTURE: CPT | Performed by: NURSE PRACTITIONER

## 2022-04-29 PROCEDURE — 83690 ASSAY OF LIPASE: CPT | Performed by: NURSE PRACTITIONER

## 2022-04-29 PROCEDURE — 86231 EMA EACH IG CLASS: CPT | Performed by: NURSE PRACTITIONER

## 2022-04-29 PROCEDURE — 86364 TISS TRNSGLTMNASE EA IG CLAS: CPT | Performed by: NURSE PRACTITIONER

## 2022-04-29 PROCEDURE — 82784 ASSAY IGA/IGD/IGG/IGM EACH: CPT | Performed by: NURSE PRACTITIONER

## 2022-04-29 RX ORDER — DICYCLOMINE HYDROCHLORIDE 10 MG/1
10 CAPSULE ORAL 3 TIMES DAILY PRN
Qty: 60 CAPSULE | Refills: 3 | Status: SHIPPED | OUTPATIENT
Start: 2022-04-29 | End: 2022-06-29

## 2022-04-29 RX ORDER — PANTOPRAZOLE SODIUM 40 MG/1
40 TABLET, DELAYED RELEASE ORAL 2 TIMES DAILY
Qty: 180 TABLET | Refills: 3 | Status: SHIPPED | OUTPATIENT
Start: 2022-04-29

## 2022-04-29 RX ORDER — SUCRALFATE 1 G/1
1 TABLET ORAL 4 TIMES DAILY
Qty: 40 TABLET | Refills: 1 | Status: SHIPPED | OUTPATIENT
Start: 2022-04-29 | End: 2022-05-23 | Stop reason: SDUPTHER

## 2022-04-29 NOTE — PROGRESS NOTES
Chief Complaint   Patient presents with   • Abdominal Pain       HPI    Breanne Garrido is a  51 y.o. female here establish care as a new patient for complaints of abdominal pain and dyspepsia.    This patient will also follow with Dr. Pavon.    Work-up with her PCP has included a CT of the abdomen without contrast: Status post cholecystectomy. Small to moderate amount of stool in the colon.  Recent hemogram normal.  Recent  otherwise normal liver function test.    Patient reports approximately 1 month of upper abdominal pain that distributes like a band across her entire abdomen can radiate up into her shoulder blades and back.  Pain debilitating with onset but has slightly improved with PPI therapy and Carafate prescribed by her primary care provider Dr. Louis.  Significant dyspepsia.  Denies nausea, vomiting, dysphagia, odynophagia.  She does admit that up until recently she was taking NSAIDs daily for generalized body aches.  She works at [employer] and does a lot of repetitive movements with her upper body.  Former smoker.  She does not drink alcohol.    Denies diarrhea, constipation, rectal bleeding.    Gallbladder removed in her 20s for gallstones.    No family history of colon cancer.  Patient has never had a colonoscopy for screening purposes.    Past Medical History:   Diagnosis Date   • Allergic    • Asthma    • GERD (gastroesophageal reflux disease)        Past Surgical History:   Procedure Laterality Date   • BREAST SURGERY     • CHOLECYSTECTOMY     • UPPER GASTROINTESTINAL ENDOSCOPY N/A        Scheduled Meds:     Continuous Infusions: No current facility-administered medications for this visit.      PRN Meds:     No Known Allergies    Social History     Socioeconomic History   • Marital status: Single   Tobacco Use   • Smoking status: Former Smoker     Years: 25.00     Quit date: 2013     Years since quittin.9   • Smokeless tobacco: Never Used   Vaping Use   • Vaping Use: Never used    Substance and Sexual Activity   • Alcohol use: No   • Drug use: No   • Sexual activity: Defer       Family History   Problem Relation Age of Onset   • Hyperlipidemia Father    • Hypertension Father    • Colon cancer Maternal Aunt    • Heart disease Maternal Grandmother    • Heart disease Paternal Grandmother    • Heart disease Paternal Grandfather        Review of Systems   Constitutional: Negative for activity change, appetite change, fatigue and unexpected weight change.   HENT: Negative for trouble swallowing.    Eyes: Negative.    Respiratory: Negative.    Cardiovascular: Negative.    Gastrointestinal: Positive for abdominal pain. Negative for abdominal distention, anal bleeding, blood in stool, constipation, diarrhea, nausea, rectal pain and vomiting.   Endocrine: Negative.    Genitourinary: Negative.    Musculoskeletal: Negative.    Allergic/Immunologic: Negative.    Neurological: Negative.    Hematological: Negative.    Psychiatric/Behavioral: Negative.        Vitals:    04/29/22 1334   BP: 135/80   Pulse: 82   Temp: 97.1 °F (36.2 °C)   SpO2: 98%       Physical Exam  Constitutional:       Appearance: She is well-developed.   Abdominal:      General: Bowel sounds are normal. There is no distension.      Palpations: Abdomen is soft. There is no mass.      Tenderness: There is abdominal tenderness. There is guarding.      Hernia: No hernia is present.          Comments: Tenderness noted in the area marked above   Skin:     General: Skin is warm and dry.      Capillary Refill: Capillary refill takes less than 2 seconds.   Neurological:      Mental Status: She is alert and oriented to person, place, and time.   Psychiatric:         Behavior: Behavior normal.       Assessment    Diagnoses and all orders for this visit:    1. Pain of upper abdomen (Primary)  -     Case Request; Standing  -     Case Request  -     Comprehensive Metabolic Panel  -     Alkaline Phosphatase, Isoenzymes  -     Mitochondrial Antibodies,  M2  -     Celiac Comprehensive Panel  -     Amylase  -     lipase  -     MRI abdomen w wo contrast mrcp; Future    2. Dyspepsia  -     Case Request; Standing  -     Case Request  -     MRI abdomen w wo contrast mrcp; Future    3. Elevated alkaline phosphatase level  -     MRI abdomen w wo contrast mrcp; Future      Other orders  -     dicyclomine (Bentyl) 10 MG capsule; Take 1 capsule by mouth 3 (Three) Times a Day As Needed (Abdominal discomfort).  Dispense: 60 capsule; Refill: 3  -     pantoprazole (PROTONIX) 40 MG EC tablet; Take 1 tablet by mouth 2 (Two) Times a Day.  Dispense: 180 tablet; Refill: 3  -     sucralfate (Carafate) 1 g tablet; Take 1 tablet by mouth 4 (Four) Times a Day.  Dispense: 40 tablet; Refill: 1     Plan    Arrange MRCP to assess the patient's biliary tree given significant upper abdominal discomfort rule out choledocholithiasis and assess her pancreas.  Labs today as above.  EGD as well to rule out peptic ulcer disease, gastritis, esophagitis, etc.  Patient due for colonoscopy  but does not feel like she can tolerate the bowel prep at this time given her significant upper abdominal discomfort.  May have to plan a colonoscopy at a later date and time.  Continue Carafate  Increase Protonix to twice daily dosing  Bentyl provided for relief in the interim  If symptoms worsen I want her to come to [facility] ER  Follow-up and further recommendations pending the aforementioned work-up         AMMY Starks  [facility]  [address] [place] [address]  Office: [phone]

## 2022-04-30 LAB — MITOCHONDRIA M2 IGG SER-ACNC: <20 UNITS (ref 0–20)

## 2022-05-02 ENCOUNTER — TELEPHONE (OUTPATIENT)
Dept: FAMILY MEDICINE CLINIC | Facility: CLINIC | Age: 52
End: 2022-05-02

## 2022-05-02 ENCOUNTER — HOSPITAL ENCOUNTER (EMERGENCY)
Facility: HOSPITAL | Age: 52
Discharge: HOME | End: 2022-05-02
Attending: EMERGENCY MEDICINE | Admitting: EMERGENCY MEDICINE

## 2022-05-02 ENCOUNTER — TELEPHONE (OUTPATIENT)
Dept: GASTROENTEROLOGY | Facility: CLINIC | Age: 52
End: 2022-05-02

## 2022-05-02 VITALS
HEIGHT: 65 IN | HEART RATE: 83 BPM | TEMPERATURE: 96.5 F | WEIGHT: 195 LBS | OXYGEN SATURATION: 96 % | SYSTOLIC BLOOD PRESSURE: 116 MMHG | DIASTOLIC BLOOD PRESSURE: 71 MMHG | RESPIRATION RATE: 16 BRPM | BODY MASS INDEX: 32.49 KG/M2

## 2022-05-02 DIAGNOSIS — R10.13 EPIGASTRIC ABDOMINAL PAIN: Primary | ICD-10-CM

## 2022-05-02 LAB
ALBUMIN SERPL-MCNC: 4.2 G/DL (ref 3.5–5.2)
ALBUMIN/GLOB SERPL: 1.4 G/DL
ALP BONE CFR SERPL: 25 % (ref 14–68)
ALP INTEST CFR SERPL: 23 % (ref 0–18)
ALP LIVER CFR SERPL: 52 % (ref 18–85)
ALP SERPL-CCNC: 106 IU/L (ref 44–121)
ALP SERPL-CCNC: 88 U/L (ref 39–117)
ALT SERPL W P-5'-P-CCNC: 15 U/L (ref 1–33)
ANION GAP SERPL CALCULATED.3IONS-SCNC: 10 MMOL/L (ref 5–15)
AST SERPL-CCNC: 19 U/L (ref 1–32)
BASOPHILS # BLD AUTO: 0.03 10*3/MM3 (ref 0–0.2)
BASOPHILS NFR BLD AUTO: 0.7 % (ref 0–1.5)
BILIRUB SERPL-MCNC: 0.2 MG/DL (ref 0–1.2)
BILIRUB UR QL STRIP: NEGATIVE
BUN SERPL-MCNC: 18 MG/DL (ref 6–20)
BUN/CREAT SERPL: 28.1 (ref 7–25)
CALCIUM SPEC-SCNC: 9.1 MG/DL (ref 8.6–10.5)
CHLORIDE SERPL-SCNC: 104 MMOL/L (ref 98–107)
CLARITY UR: CLEAR
CO2 SERPL-SCNC: 24 MMOL/L (ref 22–29)
COLOR UR: YELLOW
CREAT SERPL-MCNC: 0.64 MG/DL (ref 0.57–1)
DEPRECATED RDW RBC AUTO: 39.8 FL (ref 37–54)
EGFRCR SERPLBLD CKD-EPI 2021: 107.1 ML/MIN/1.73
EOSINOPHIL # BLD AUTO: 0.2 10*3/MM3 (ref 0–0.4)
EOSINOPHIL NFR BLD AUTO: 4.5 % (ref 0.3–6.2)
ERYTHROCYTE [DISTWIDTH] IN BLOOD BY AUTOMATED COUNT: 12.8 % (ref 12.3–15.4)
GLOBULIN UR ELPH-MCNC: 2.9 GM/DL
GLUCOSE SERPL-MCNC: 96 MG/DL (ref 65–99)
GLUCOSE UR STRIP-MCNC: NEGATIVE MG/DL
HCT VFR BLD AUTO: 38.3 % (ref 34–46.6)
HGB BLD-MCNC: 12.7 G/DL (ref 12–15.9)
HGB UR QL STRIP.AUTO: NEGATIVE
IMM GRANULOCYTES # BLD AUTO: 0.01 10*3/MM3 (ref 0–0.05)
IMM GRANULOCYTES NFR BLD AUTO: 0.2 % (ref 0–0.5)
KETONES UR QL STRIP: NEGATIVE
LEUKOCYTE ESTERASE UR QL STRIP.AUTO: NEGATIVE
LIPASE SERPL-CCNC: 41 U/L (ref 13–60)
LYMPHOCYTES # BLD AUTO: 2.25 10*3/MM3 (ref 0.7–3.1)
LYMPHOCYTES NFR BLD AUTO: 50.2 % (ref 19.6–45.3)
MCH RBC QN AUTO: 28.8 PG (ref 26.6–33)
MCHC RBC AUTO-ENTMCNC: 33.2 G/DL (ref 31.5–35.7)
MCV RBC AUTO: 86.8 FL (ref 79–97)
MONOCYTES # BLD AUTO: 0.44 10*3/MM3 (ref 0.1–0.9)
MONOCYTES NFR BLD AUTO: 9.8 % (ref 5–12)
NEUTROPHILS NFR BLD AUTO: 1.55 10*3/MM3 (ref 1.7–7)
NEUTROPHILS NFR BLD AUTO: 34.6 % (ref 42.7–76)
NITRITE UR QL STRIP: NEGATIVE
NRBC BLD AUTO-RTO: 0 /100 WBC (ref 0–0.2)
PH UR STRIP.AUTO: 6.5 [PH] (ref 5–8)
PLATELET # BLD AUTO: 309 10*3/MM3 (ref 140–450)
PMV BLD AUTO: 9.2 FL (ref 6–12)
POTASSIUM SERPL-SCNC: 3.4 MMOL/L (ref 3.5–5.2)
PROT SERPL-MCNC: 7.1 G/DL (ref 6–8.5)
PROT UR QL STRIP: NEGATIVE
RBC # BLD AUTO: 4.41 10*6/MM3 (ref 3.77–5.28)
SODIUM SERPL-SCNC: 138 MMOL/L (ref 136–145)
SP GR UR STRIP: 1.01 (ref 1–1.03)
UROBILINOGEN UR QL STRIP: NORMAL
WBC NRBC COR # BLD: 4.48 10*3/MM3 (ref 3.4–10.8)

## 2022-05-02 PROCEDURE — 80053 COMPREHEN METABOLIC PANEL: CPT | Performed by: PHYSICIAN ASSISTANT

## 2022-05-02 PROCEDURE — 83690 ASSAY OF LIPASE: CPT | Performed by: PHYSICIAN ASSISTANT

## 2022-05-02 PROCEDURE — 96374 THER/PROPH/DIAG INJ IV PUSH: CPT

## 2022-05-02 PROCEDURE — 81003 URINALYSIS AUTO W/O SCOPE: CPT | Performed by: PHYSICIAN ASSISTANT

## 2022-05-02 PROCEDURE — 85025 COMPLETE CBC W/AUTO DIFF WBC: CPT | Performed by: PHYSICIAN ASSISTANT

## 2022-05-02 PROCEDURE — 36415 COLL VENOUS BLD VENIPUNCTURE: CPT

## 2022-05-02 PROCEDURE — 99283 EMERGENCY DEPT VISIT LOW MDM: CPT

## 2022-05-02 RX ORDER — SODIUM CHLORIDE 0.9 % (FLUSH) 0.9 %
10 SYRINGE (ML) INJECTION AS NEEDED
Status: DISCONTINUED | OUTPATIENT
Start: 2022-05-02 | End: 2022-05-02 | Stop reason: HOSPADM

## 2022-05-02 RX ORDER — FAMOTIDINE 10 MG/ML
20 INJECTION, SOLUTION INTRAVENOUS ONCE
Status: COMPLETED | OUTPATIENT
Start: 2022-05-02 | End: 2022-05-02

## 2022-05-02 RX ADMIN — FAMOTIDINE 20 MG: 10 INJECTION INTRAVENOUS at 09:47

## 2022-05-02 NOTE — ED NOTES
"Pt arrives ambulatory to the triage desk via PV.    Pt states \"I am having abdominal pain and my GI Dr told me to come to the ER if any symptoms got worse. I seen her on Friday, and since then the pain has gotten worse, as well as the nausea.\"    Patient was placed in face mask during first look triage.  Patient was wearing a face mask throughout encounter.  I wore personal protective equipment throughout the encounter.  Hand hygiene was performed before and after patient encounter.    "

## 2022-05-02 NOTE — TELEPHONE ENCOUNTER
Pt in ED and message was sent to me and then GI for guidance. Review of chart shows GI is responding.

## 2022-05-02 NOTE — ED PROVIDER NOTES
Pt presents to the ED c/o  ongoing epigastric and right upper quadrant pain, saw GI on Friday and was told to come to the ER for worsening symptoms.  Symptoms did not improve over the weekend.  No fevers or chills, has not had any vomiting, has had prior cholecystectomy.     On exam,   General: No acute distress, nontoxic  HEENT: Mucous membranes moist, atraumatic, EOMI  Neck: Full ROM  Pulm: Symmetric chest rise, nonlabored, lungs CTAB  Cardiovascular: Regular rate and rhythm, intact distal pulses  GI: Soft, epigastric and right upper quadrant tenderness to palpation, nondistended, no rebound, no guarding, bowel sounds present  MSK: Full ROM, no deformity  Skin: Warm, dry  Neuro: Awake, alert, oriented x 4, GCS 15, moving all extremities, no focal deficits  Psych: Calm, cooperative      N95, protective eye goggles, and gloves used during this encounter. Patient in surgical mask.      Plan:   ED Course as of 05/02/22 1637   Mon May 02, 2022   0840 Patient's white blood cell count is normal.  Lipase is within normal limits.  LFTs are normal as well.  Page has been placed to her GI group as they are the ones who instructed her to come to the ER with worsening pain.  I have informed the patient of lab findings and that we are awaiting callback at this time.  Patient was offered pain medication but declined. [AH]   0939 Patient rechecked.  I informed her we are awaiting callback from whoever is on-call with her GI group.  Patient is willing to wait at this time.  I have offered her some Pepcid IV. [AH]   1100 Had a long conversation with the patient.  We have been unsuccessful in getting a hold of GI.  She states she will continue to call today and tomorrow to move forward with next steps with them and is agreeable with this plan.  I told her to return if pain is intractable.  I did offer her a GI cocktail and she declined. [AH]      ED Course User Index  [AH] Ngoc Pizarro PA     Plan to consult GI as they had  ordered an outpatient MRCP for the patient, will discuss if they want to arrange that for her now in the hospital or continue to follow-up in the outpatient setting.  She has to this point declined any pain medications.     Attestation:  The AMANDEEP and I have discussed this patient's history, physical exam, and treatment plan.  I have reviewed the documentation and personally had a face to face interaction with the patient. I affirm the documentation and agree with the treatment and plan.  The attached note describes my personal findings.          Diego Rausch MD  05/02/22 0702

## 2022-05-02 NOTE — DISCHARGE INSTRUCTIONS
Although you are being discharged from the ED today, I encourage you to return for worsening symptoms. Things can, and do, change such that treatment at home with medication may not be adequate. Specifically I recommend returning for chest pain or discomfort, intractable abdominal pain, intractable vomiting, fever > 102.0 F, or any other worsening or alarming symptoms.     Rest. Drink plenty of fluids.  Eat a bland diet as tolerated  Follow up with Gi for further evaluation and management.  Follow up with primary care provider for further management and to have blood pressure rechecked.  Take your medications as prescribed.

## 2022-05-02 NOTE — ED PROVIDER NOTES
EMERGENCY DEPARTMENT ENCOUNTER    Room Number:  06/06  Date seen:  5/2/2022  Time seen: 07:23 EDT  PCP: Vincent Louis MD  Historian: patient      HPI:  Chief Complaint: abdominal pain    Context: Breanne Garrido is a 51 y.o. female who presents to the ED for evaluation of abdominal pain.  Patient states she has had ongoing abdominal pain since the beginning of April.  She states she is had a history of gastritis before in the past and states this feels similar.  She has been in to see her primary care doctor several times for this complaint.  She states she also saw a GI provider on Friday of last week who informed her to go to the ER if her pain gets any worse.  Patient states she is in constant pain in her upper abdomen and epigastric region.  She states that sometimes radiates around to the right side of her upper abdomen and through to her back.  She denies any vomiting.  She denies any change in stool or bloody stools.  She denies any urinary symptoms.  Her only abdominal surgery in the past is a cholecystectomy.  She denies any alcohol use.  She denies any fever, chills.      PAST MEDICAL HISTORY  Active Ambulatory Problems     Diagnosis Date Noted   • Mass of breast 06/15/2012   • Seasonal allergic rhinitis 11/10/2016   • Asthma 11/10/2016   • Pain of upper abdomen 04/29/2022   • Dyspepsia 04/29/2022     Resolved Ambulatory Problems     Diagnosis Date Noted   • No Resolved Ambulatory Problems     Past Medical History:   Diagnosis Date   • Allergic    • GERD (gastroesophageal reflux disease)          PAST SURGICAL HISTORY  Past Surgical History:   Procedure Laterality Date   • BREAST SURGERY     • CHOLECYSTECTOMY     • UPPER GASTROINTESTINAL ENDOSCOPY N/A 2017         FAMILY HISTORY  Family History   Problem Relation Age of Onset   • Hyperlipidemia Father    • Hypertension Father    • Colon cancer Maternal Aunt    • Heart disease Maternal Grandmother    • Heart disease Paternal Grandmother    • Heart  disease Paternal Grandfather          SOCIAL HISTORY  Social History     Socioeconomic History   • Marital status: Single   Tobacco Use   • Smoking status: Former Smoker     Years: 25.00     Quit date: 2013     Years since quittin.9   • Smokeless tobacco: Never Used   Vaping Use   • Vaping Use: Never used   Substance and Sexual Activity   • Alcohol use: No   • Drug use: No   • Sexual activity: Defer         ALLERGIES  Patient has no known allergies.        REVIEW OF SYSTEMS  Review of Systems   Constitutional: Negative for chills and fever.   Respiratory: Negative for shortness of breath.    Cardiovascular: Negative for chest pain.   Gastrointestinal: Positive for abdominal pain and nausea. Negative for blood in stool, diarrhea and vomiting.   Genitourinary: Negative for dysuria and flank pain.   Musculoskeletal: Positive for back pain.   Neurological: Negative for numbness.        All systems reviewed and negative except for those discussed in HPI.       PHYSICAL EXAM  ED Triage Vitals   Temp Heart Rate Resp BP SpO2   22 0634 22 0634 22 0634 22 0635 22 0634   96.5 °F (35.8 °C) 83 16 137/80 96 %      Temp src Heart Rate Source Patient Position BP Location FiO2 (%)   22 0634 22 0634 22 0635 22 0635 --   Tympanic Monitor Standing Left arm          GENERAL: not distressed  HENT: atraumatic  EYES: no scleral icterus  CV: regular rhythm, regular rate  RESPIRATORY: normal effort  ABDOMEN: soft, tender with guarding the epigastric region.  No rigidity.  Normal bowel sounds.  Slight tenderness in the right upper quadrant as well.  MUSCULOSKELETAL: no deformity  NEURO: alert, moves all extremities, follows commands  SKIN: warm, dry    Vital signs and nursing notes reviewed.          LAB RESULTS  Recent Results (from the past 24 hour(s))   Comprehensive Metabolic Panel    Collection Time: 22  7:09 AM    Specimen: Blood   Result Value Ref Range    Glucose 96  65 - 99 mg/dL    BUN 18 6 - 20 mg/dL    Creatinine 0.64 0.57 - 1.00 mg/dL    Sodium 138 136 - 145 mmol/L    Potassium 3.4 (L) 3.5 - 5.2 mmol/L    Chloride 104 98 - 107 mmol/L    CO2 24.0 22.0 - 29.0 mmol/L    Calcium 9.1 8.6 - 10.5 mg/dL    Total Protein 7.1 6.0 - 8.5 g/dL    Albumin 4.20 3.50 - 5.20 g/dL    ALT (SGPT) 15 1 - 33 U/L    AST (SGOT) 19 1 - 32 U/L    Alkaline Phosphatase 88 39 - 117 U/L    Total Bilirubin 0.2 0.0 - 1.2 mg/dL    Globulin 2.9 gm/dL    A/G Ratio 1.4 g/dL    BUN/Creatinine Ratio 28.1 (H) 7.0 - 25.0    Anion Gap 10.0 5.0 - 15.0 mmol/L    eGFR 107.1 >60.0 mL/min/1.73   CBC Auto Differential    Collection Time: 05/02/22  7:09 AM    Specimen: Blood   Result Value Ref Range    WBC 4.48 3.40 - 10.80 10*3/mm3    RBC 4.41 3.77 - 5.28 10*6/mm3    Hemoglobin 12.7 12.0 - 15.9 g/dL    Hematocrit 38.3 34.0 - 46.6 %    MCV 86.8 79.0 - 97.0 fL    MCH 28.8 26.6 - 33.0 pg    MCHC 33.2 31.5 - 35.7 g/dL    RDW 12.8 12.3 - 15.4 %    RDW-SD 39.8 37.0 - 54.0 fl    MPV 9.2 6.0 - 12.0 fL    Platelets 309 140 - 450 10*3/mm3    Neutrophil % 34.6 (L) 42.7 - 76.0 %    Lymphocyte % 50.2 (H) 19.6 - 45.3 %    Monocyte % 9.8 5.0 - 12.0 %    Eosinophil % 4.5 0.3 - 6.2 %    Basophil % 0.7 0.0 - 1.5 %    Immature Grans % 0.2 0.0 - 0.5 %    Neutrophils, Absolute 1.55 (L) 1.70 - 7.00 10*3/mm3    Lymphocytes, Absolute 2.25 0.70 - 3.10 10*3/mm3    Monocytes, Absolute 0.44 0.10 - 0.90 10*3/mm3    Eosinophils, Absolute 0.20 0.00 - 0.40 10*3/mm3    Basophils, Absolute 0.03 0.00 - 0.20 10*3/mm3    Immature Grans, Absolute 0.01 0.00 - 0.05 10*3/mm3    nRBC 0.0 0.0 - 0.2 /100 WBC   Lipase    Collection Time: 05/02/22  7:09 AM    Specimen: Blood   Result Value Ref Range    Lipase 41 13 - 60 U/L   Urinalysis With Microscopic If Indicated (No Culture) - Urine, Clean Catch    Collection Time: 05/02/22  8:56 AM    Specimen: Urine, Clean Catch   Result Value Ref Range    Color, UA Yellow Yellow, Straw    Appearance, UA Clear Clear    pH, UA  6.5 5.0 - 8.0    Specific Gravity, UA 1.015 1.005 - 1.030    Glucose, UA Negative Negative    Ketones, UA Negative Negative    Bilirubin, UA Negative Negative    Blood, UA Negative Negative    Protein, UA Negative Negative    Leuk Esterase, UA Negative Negative    Nitrite, UA Negative Negative    Urobilinogen, UA 0.2 E.U./dL 0.2 - 1.0 E.U./dL       Ordered the above labs and independently reviewed the results.        RADIOLOGY  CT Abdomen Without Contrast    Result Date: 4/22/2022  Narrative: CT OF THE ABDOMEN WITHOUT CONTRAST 04/22/2022  HISTORY: Worsening left upper quadrant and epigastric pain.  Spiral images were obtained from the lung bases to the iliac crests. No intravenous or oral contrast was given.  Gallbladder has been removed. Small low-density lesion is seen in the right hepatic lobe on image 38 likely a benign hepatic cyst. Liver is otherwise unremarkable.  The spleen, pancreas, adrenals and kidneys appear unremarkable.  Small to moderate amount of stool seen in the colon. No bowel wall thickening or bowel dilatation is seen.  No masses, abnormal fluid collections or free air is seen.      Impression: 1. Status post cholecystectomy. 2. Small to moderate amount of stool in the colon. 3. No other significant findings are noted.  Radiation dose reduction techniques were utilized, including automated exposure control and exposure modulation based on body size.  This report was finalized on 4/22/2022 1:37 PM by Dr. Christiano Diaz M.D.        I ordered the above noted radiological studies. Reviewed by me and discussed with radiologist.  See dictation for official radiology interpretation.      MEDICATIONS GIVEN IN ER  Medications   sodium chloride 0.9 % flush 10 mL (has no administration in time range)   famotidine (PEPCID) injection 20 mg (20 mg Intravenous Given 5/2/22 8339)       MEDICAL RECORD REVIEW  I reviewed the patient's records.  Patient saw AMMY Riley with GI on Friday of last week.   She started her on dicyclomine Protonix and Carafate and is working to arrange an MRCP as well as EGD.    Patient had a CT scan of the abdomen on 4/22/2022 which showed small to moderate amount of stool in the colon and no significant findings noted otherwise.  Patient is status postcholecystectomy.      PROGRESS, DATA ANALYSIS, CONSULTS, AND MEDICAL DECISION MAKING    All labs have been independently reviewed by me.  All radiology studies have been reviewed by me. Discussion below represents my analysis of pertinent findings related to patient's condition, differential diagnosis, treatment plan and final disposition.    DDX includes but not limited to gastritis, pancreatitis, colitis, GERD, ulcer.      ED Course as of 05/02/22 1107   Mon May 02, 2022   0840 Patient's white blood cell count is normal.  Lipase is within normal limits.  LFTs are normal as well.  Page has been placed to her GI group as they are the ones who instructed her to come to the ER with worsening pain.  I have informed the patient of lab findings and that we are awaiting callback at this time.  Patient was offered pain medication but declined. [AH]   0939 Patient rechecked.  I informed her we are awaiting callback from whoever is on-call with her GI group.  Patient is willing to wait at this time.  I have offered her some Pepcid IV. [AH]   1100 Had a long conversation with the patient.  We have been unsuccessful in getting a hold of GI.  She states she will continue to call today and tomorrow to move forward with next steps with them and is agreeable with this plan.  I told her to return if pain is intractable.  I did offer her a GI cocktail and she declined. [AH]      ED Course User Index  [AH] Ngoc Pizarro PA           Reviewed pt's history and workup with Dr. Rausch.  After a bedside evaluation; they agree with the plan of care      Patient's disposition is discharge.    Patient was placed in face mask in first look. Patient was wearing  facemask each time I entered the room and throughout our encounter. I wore full protective equipment throughout this patient encounter including a face mask, eye shield, gown and gloves. Hand hygiene was performed before donning protective equipment and after removal when leaving the room.        DIAGNOSIS  Final diagnoses:   Epigastric abdominal pain           Latest Documented Vital Signs:  As of 11:07 EDT  BP- 116/71 HR- 83 Temp- 96.5 °F (35.8 °C) (Tympanic) O2 sat- 96%         Ngoc Pizarro, PA  05/02/22 1107

## 2022-05-06 ENCOUNTER — TELEPHONE (OUTPATIENT)
Dept: GASTROENTEROLOGY | Facility: CLINIC | Age: 52
End: 2022-05-06

## 2022-05-06 NOTE — TELEPHONE ENCOUNTER
Spoke with patient and informed her we do not have any [facility] papers for her.  Patient stated she would have papers refax to our office at [phone].  Advised patient she could also hand deliver the papers to our office.  Patient voiced understanding.

## 2022-05-09 NOTE — TELEPHONE ENCOUNTER
Patient turned in papers for short term disability and not FMLA to be filled out.  Will check with AMMY Garland for approval before proceeding with filling out papers.

## 2022-05-10 ENCOUNTER — HOSPITAL ENCOUNTER (OUTPATIENT)
Dept: MRI IMAGING | Facility: HOSPITAL | Age: 52
Discharge: HOME | End: 2022-05-10
Admitting: NURSE PRACTITIONER

## 2022-05-10 ENCOUNTER — TELEPHONE (OUTPATIENT)
Dept: GASTROENTEROLOGY | Facility: CLINIC | Age: 52
End: 2022-05-10

## 2022-05-10 DIAGNOSIS — R74.8 ELEVATED ALKALINE PHOSPHATASE LEVEL: ICD-10-CM

## 2022-05-10 DIAGNOSIS — R10.10 PAIN OF UPPER ABDOMEN: ICD-10-CM

## 2022-05-10 DIAGNOSIS — R10.13 DYSPEPSIA: ICD-10-CM

## 2022-05-10 LAB — REF LAB TEST METHOD: NORMAL

## 2022-05-10 PROCEDURE — 25010000002 GADOTERIDOL PER 1 ML: Performed by: NURSE PRACTITIONER

## 2022-05-10 PROCEDURE — 74183 MRI ABD W/O CNTR FLWD CNTR: CPT

## 2022-05-10 PROCEDURE — A9579 GAD-BASE MR CONTRAST NOS,1ML: HCPCS | Performed by: NURSE PRACTITIONER

## 2022-05-10 RX ADMIN — GADOTERIDOL 20 ML: 279.3 INJECTION, SOLUTION INTRAVENOUS at 13:19

## 2022-05-10 NOTE — TELEPHONE ENCOUNTER
----- Message from AMMY Starks sent at 5/10/2022 12:48 PM EDT -----  Please inform the patient nothing worrisome on labs.

## 2022-05-10 NOTE — PROGRESS NOTES
Please notify Breanne that her MRCP is normal.  She has normal findings related to prior gallbladder surgery.  A small liver cyst which are not causing any trouble.  Moderate stool burden.  We can discuss further at her next office visit.

## 2022-05-11 ENCOUNTER — TELEPHONE (OUTPATIENT)
Dept: GASTROENTEROLOGY | Facility: CLINIC | Age: 52
End: 2022-05-11

## 2022-05-11 NOTE — TELEPHONE ENCOUNTER
----- Message from AMMY Starks sent at 5/10/2022  3:04 PM EDT -----  Please notify Breanne that her MRCP is normal.  She has normal findings related to prior gallbladder surgery.  A small liver cyst which are not causing any trouble.  Moderate stool burden.  We can discuss further at her next office visit.

## 2022-05-12 ENCOUNTER — OFFICE VISIT (OUTPATIENT)
Dept: GASTROENTEROLOGY | Facility: CLINIC | Age: 52
End: 2022-05-12

## 2022-05-12 VITALS
DIASTOLIC BLOOD PRESSURE: 80 MMHG | HEIGHT: 65 IN | WEIGHT: 195.4 LBS | BODY MASS INDEX: 32.55 KG/M2 | SYSTOLIC BLOOD PRESSURE: 124 MMHG | HEART RATE: 76 BPM | OXYGEN SATURATION: 98 % | TEMPERATURE: 96.5 F

## 2022-05-12 DIAGNOSIS — R11.0 NAUSEA: ICD-10-CM

## 2022-05-12 DIAGNOSIS — R10.10 PAIN OF UPPER ABDOMEN: Primary | ICD-10-CM

## 2022-05-12 DIAGNOSIS — R10.13 DYSPEPSIA: ICD-10-CM

## 2022-05-12 PROCEDURE — 99213 OFFICE O/P EST LOW 20 MIN: CPT | Performed by: PHYSICIAN ASSISTANT

## 2022-05-12 NOTE — PROGRESS NOTES
"Chief Complaint  Abdominal Pain and Nausea    Subjective          History of Present Illness    Breanne Garrido is a  51 y.o. female presents for follow-up of abdominal pain and dyspepsia.  She is a patient of Dr. Pavon last seen by Janet Lewis on 4/29/2022.  She is new to me.    She follows up on upper abdominal pain described as a bandlike feeling radiating to back.  Symptoms started early April suddenly in the middle the night.  She is taking pantoprazole 40 mg twice daily and Carafate--she thinks this has improved her symptoms.  She was also given Bentyl to try as needed for the pain--this is not helping. Pain is still present but not as bad as prior to starting PPI/Carafate. Worse with physical activity, bending over. Eating can make it worse and she has upper abdominal distention. If she eats small amounts, bland, this helps. She is trying eat bland, avoiding dairy. She will have nausea with mowing. She does take NSAIDS previously but not in some time. Some nocturnal awakenings but this is getting better. She works at [employer] with heavy lifting and is unable to work due to the pain.     Negative celiac labs.  MRCP normal other than small liver cyst and moderate stool burden.    CT of the abdomen without contrast: Status post cholecystectomy. Small to moderate amount of stool in the colon.  Recent hemogram normal.  Recent  otherwise normal liver function test.    She has EGD scheduled with Dr. Pavon on 5/20/2022.  Colonoscopy was not scheduled as patient did not feel if she could tolerate a bowel prep due to her upper abdominal discomfort.    Cholecystectomy in her 20s for gallstones.  No family history of colon cancer.  She has never had a colonoscopy before.    Objective   Vital Signs:   /80   Pulse 76   Temp 96.5 °F (35.8 °C)   Ht 165.1 cm (65\")   Wt 88.6 kg (195 lb 6.4 oz)   SpO2 98%   BMI 32.52 kg/m²       Physical Exam  Vitals reviewed.   Constitutional:       General: She is awake. She " is not in acute distress.     Appearance: Normal appearance. She is well-developed and well-groomed.   HENT:      Head: Normocephalic.   Pulmonary:      Effort: Pulmonary effort is normal. No respiratory distress.   Skin:     Coloration: Skin is not pale.   Neurological:      Mental Status: She is alert and oriented to person, place, and time.      Gait: Gait is intact.   Psychiatric:         Mood and Affect: Mood and affect normal.         Speech: Speech normal.         Behavior: Behavior is cooperative.         Judgment: Judgment normal.          Result Review :             Assessment and Plan    Diagnoses and all orders for this visit:    1. Pain of upper abdomen (Primary)    2. Dyspepsia    3. Nausea    Proceed with EGD as scheduled on 5/20/2022.  We will make follow-up with Janet 2 weeks after to review results and biopsies.  Continue pantoprazole and sucralfate as prescribed.  Continue to avoid NSAIDs and continue bland diet.    Completed short-term disability for work given she is unable to do much activity without severe abdominal pain.  Previous exam elicited guarding in abdomen with palpation.    Follow Up   Return in about 4 weeks (around 6/9/2022) for Janet GIMENEZ.    Dragon dictation used throughout this note.     Carmenza Larose PA-C

## 2022-05-13 ENCOUNTER — TELEPHONE (OUTPATIENT)
Dept: GASTROENTEROLOGY | Facility: CLINIC | Age: 52
End: 2022-05-13

## 2022-05-13 NOTE — TELEPHONE ENCOUNTER
called pt on 5/13 to rs Dr Pavon will not be in office, pt is going to call on Monday to see if she can schedule with someone else

## 2022-05-17 ENCOUNTER — APPOINTMENT (OUTPATIENT)
Dept: MRI IMAGING | Facility: HOSPITAL | Age: 52
End: 2022-05-17

## 2022-05-23 ENCOUNTER — TELEPHONE (OUTPATIENT)
Dept: GASTROENTEROLOGY | Facility: CLINIC | Age: 52
End: 2022-05-23

## 2022-05-23 ENCOUNTER — TRANSCRIBE ORDERS (OUTPATIENT)
Dept: ADMINISTRATIVE | Facility: HOSPITAL | Age: 52
End: 2022-05-23

## 2022-05-23 DIAGNOSIS — Z01.818 OTHER SPECIFIED PRE-OPERATIVE EXAMINATION: Primary | ICD-10-CM

## 2022-05-23 DIAGNOSIS — K29.00 ACUTE GASTRITIS WITHOUT HEMORRHAGE, UNSPECIFIED GASTRITIS TYPE: ICD-10-CM

## 2022-05-23 RX ORDER — SUCRALFATE 1 G/1
1 TABLET ORAL 4 TIMES DAILY
Qty: 40 TABLET | Refills: 1 | Status: SHIPPED | OUTPATIENT
Start: 2022-05-23 | End: 2022-06-08

## 2022-05-23 NOTE — TELEPHONE ENCOUNTER
----- Message from Breanne Garrido sent at 5/22/2022  2:46 PM EDT -----  Regarding: Prescription refill  I am in need of a refill for the sucralfate and my pharmacy hasn’t received any responses from their requests. If you could please send an approval to refill it I would greatly appreciated.     Thank you    Breanne Garrido

## 2022-05-26 ENCOUNTER — TELEPHONE (OUTPATIENT)
Dept: GASTROENTEROLOGY | Facility: CLINIC | Age: 52
End: 2022-05-26

## 2022-05-26 DIAGNOSIS — Z01.818 PRE-OP TESTING: Primary | ICD-10-CM

## 2022-05-31 ENCOUNTER — LAB (OUTPATIENT)
Dept: LAB | Facility: HOSPITAL | Age: 52
End: 2022-05-31

## 2022-05-31 DIAGNOSIS — Z01.818 OTHER SPECIFIED PRE-OPERATIVE EXAMINATION: ICD-10-CM

## 2022-05-31 LAB — SARS-COV-2 ORF1AB RESP QL NAA+PROBE: NOT DETECTED

## 2022-05-31 PROCEDURE — U0004 COV-19 TEST NON-CDC HGH THRU: HCPCS

## 2022-05-31 PROCEDURE — C9803 HOPD COVID-19 SPEC COLLECT: HCPCS

## 2022-06-01 ENCOUNTER — ANESTHESIA (OUTPATIENT)
Dept: GASTROENTEROLOGY | Facility: HOSPITAL | Age: 52
End: 2022-06-01

## 2022-06-01 ENCOUNTER — ANESTHESIA EVENT (OUTPATIENT)
Dept: GASTROENTEROLOGY | Facility: HOSPITAL | Age: 52
End: 2022-06-01

## 2022-06-01 ENCOUNTER — HOSPITAL ENCOUNTER (OUTPATIENT)
Facility: HOSPITAL | Age: 52
Setting detail: HOSPITAL OUTPATIENT SURGERY
Discharge: HOME | End: 2022-06-01
Attending: INTERNAL MEDICINE | Admitting: INTERNAL MEDICINE

## 2022-06-01 VITALS
RESPIRATION RATE: 16 BRPM | HEART RATE: 62 BPM | HEIGHT: 65 IN | OXYGEN SATURATION: 100 % | BODY MASS INDEX: 33.67 KG/M2 | SYSTOLIC BLOOD PRESSURE: 127 MMHG | TEMPERATURE: 97 F | WEIGHT: 202.1 LBS | DIASTOLIC BLOOD PRESSURE: 92 MMHG

## 2022-06-01 DIAGNOSIS — R10.10 PAIN OF UPPER ABDOMEN: ICD-10-CM

## 2022-06-01 DIAGNOSIS — R10.13 DYSPEPSIA: ICD-10-CM

## 2022-06-01 LAB
B-HCG UR QL: NEGATIVE
EXPIRATION DATE: NORMAL
INTERNAL NEGATIVE CONTROL: NEGATIVE
INTERNAL POSITIVE CONTROL: POSITIVE
Lab: NORMAL

## 2022-06-01 PROCEDURE — 81025 URINE PREGNANCY TEST: CPT | Performed by: INTERNAL MEDICINE

## 2022-06-01 PROCEDURE — 25010000002 PROPOFOL 10 MG/ML EMULSION: Performed by: ANESTHESIOLOGY

## 2022-06-01 PROCEDURE — 88305 TISSUE EXAM BY PATHOLOGIST: CPT | Performed by: INTERNAL MEDICINE

## 2022-06-01 PROCEDURE — 43239 EGD BIOPSY SINGLE/MULTIPLE: CPT | Performed by: INTERNAL MEDICINE

## 2022-06-01 RX ORDER — PROPOFOL 10 MG/ML
VIAL (ML) INTRAVENOUS AS NEEDED
Status: DISCONTINUED | OUTPATIENT
Start: 2022-06-01 | End: 2022-06-01 | Stop reason: SURG

## 2022-06-01 RX ORDER — SODIUM CHLORIDE 0.9 % (FLUSH) 0.9 %
10 SYRINGE (ML) INJECTION AS NEEDED
Status: DISCONTINUED | OUTPATIENT
Start: 2022-06-01 | End: 2022-06-01 | Stop reason: HOSPADM

## 2022-06-01 RX ORDER — LIDOCAINE HYDROCHLORIDE 20 MG/ML
INJECTION, SOLUTION INFILTRATION; PERINEURAL AS NEEDED
Status: DISCONTINUED | OUTPATIENT
Start: 2022-06-01 | End: 2022-06-01 | Stop reason: SURG

## 2022-06-01 RX ORDER — SODIUM CHLORIDE, SODIUM LACTATE, POTASSIUM CHLORIDE, CALCIUM CHLORIDE 600; 310; 30; 20 MG/100ML; MG/100ML; MG/100ML; MG/100ML
30 INJECTION, SOLUTION INTRAVENOUS CONTINUOUS PRN
Status: DISCONTINUED | OUTPATIENT
Start: 2022-06-01 | End: 2022-06-01 | Stop reason: HOSPADM

## 2022-06-01 RX ORDER — SODIUM CHLORIDE, SODIUM LACTATE, POTASSIUM CHLORIDE, CALCIUM CHLORIDE 600; 310; 30; 20 MG/100ML; MG/100ML; MG/100ML; MG/100ML
INJECTION, SOLUTION INTRAVENOUS CONTINUOUS PRN
Status: DISCONTINUED | OUTPATIENT
Start: 2022-06-01 | End: 2022-06-01 | Stop reason: SURG

## 2022-06-01 RX ORDER — SODIUM CHLORIDE 0.9 % (FLUSH) 0.9 %
10 SYRINGE (ML) INJECTION EVERY 12 HOURS SCHEDULED
Status: DISCONTINUED | OUTPATIENT
Start: 2022-06-01 | End: 2022-06-01 | Stop reason: HOSPADM

## 2022-06-01 RX ORDER — PROPOFOL 10 MG/ML
VIAL (ML) INTRAVENOUS CONTINUOUS PRN
Status: DISCONTINUED | OUTPATIENT
Start: 2022-06-01 | End: 2022-06-01 | Stop reason: SURG

## 2022-06-01 RX ADMIN — SODIUM CHLORIDE, POTASSIUM CHLORIDE, SODIUM LACTATE AND CALCIUM CHLORIDE: 600; 310; 30; 20 INJECTION, SOLUTION INTRAVENOUS at 12:38

## 2022-06-01 RX ADMIN — SODIUM CHLORIDE, POTASSIUM CHLORIDE, SODIUM LACTATE AND CALCIUM CHLORIDE 30 ML/HR: 600; 310; 30; 20 INJECTION, SOLUTION INTRAVENOUS at 12:00

## 2022-06-01 RX ADMIN — Medication 160 MCG/KG/MIN: at 12:44

## 2022-06-01 RX ADMIN — LIDOCAINE HYDROCHLORIDE 60 MG: 20 INJECTION, SOLUTION INFILTRATION; PERINEURAL at 12:41

## 2022-06-01 RX ADMIN — PROPOFOL 80 MG: 10 INJECTION, EMULSION INTRAVENOUS at 12:41

## 2022-06-01 NOTE — BRIEF OP NOTE
ESOPHAGOGASTRODUODENOSCOPY  Progress Note    Breanne Garrido  6/1/2022    Pre-op Diagnosis:   Pain of upper abdomen [R10.10]  Dyspepsia [R10.13]       Post-Op Diagnosis Codes:     * Pain of upper abdomen [R10.10]     * Dyspepsia [R10.13]     * Gastritis [K29.70]     * Esophageal dysmotility [K22.4]    Procedure/CPT® Codes:        Procedure(s):  ESOPHAGOGASTRODUODENOSCOPY with biopsies    Surgeon(s):  Federico Pavon MD    Anesthesia: Monitored Anesthesia Care    Staff:   Endo Technician: Vandana New  Endo Nurse: Tashia Gerardo RN         Estimated Blood Loss: minimal    Urine Voided: * No values recorded between 6/1/2022 12:32 PM and 6/1/2022 12:51 PM *    Specimens:                Specimens     ID Source Type Tests Collected By Collected At Frozen?    A Gastric, Antrum Tissue · TISSUE PATHOLOGY EXAM   Federico Pavon MD 6/1/22 1242     Description: bx    Comment: forceps    B Esophagus, Mid Tissue · TISSUE PATHOLOGY EXAM   Federico Pavon MD 6/1/22 1247     Description: bx    Comment: forceps                Drains: * No LDAs found *    Findings: EGD with abnormal esophageal motility biopsies taken to rule out eosinophilic esophagitis.  Antral gastritis noted questionable healing prepyloric ulcer biopsies obtained.  Retroflex view the GE junction was normal with normal duodenal mucosa throughout.        Complications: None          Federico Pavon MD     Date: 6/1/2022  Time: 12:51 EDT

## 2022-06-01 NOTE — ANESTHESIA PREPROCEDURE EVALUATION
Anesthesia Evaluation     Patient summary reviewed and Nursing notes reviewed                Airway   Mallampati: I  TM distance: >3 FB  Neck ROM: full  No difficulty expected  Dental - normal exam     Pulmonary - negative pulmonary ROS and normal exam   Cardiovascular - negative cardio ROS and normal exam        Neuro/Psych- negative ROS  GI/Hepatic/Renal/Endo    (+) obesity,  GERD,      Musculoskeletal (-) negative ROS    Abdominal  - normal exam    Bowel sounds: normal.   Substance History - negative use     OB/GYN negative ob/gyn ROS         Other                        Anesthesia Plan    ASA 2     MAC     intravenous induction     Anesthetic plan, all risks, benefits, and alternatives have been provided, discussed and informed consent has been obtained with: patient.        CODE STATUS:

## 2022-06-01 NOTE — ANESTHESIA POSTPROCEDURE EVALUATION
"Patient: Breanne Garrido    Procedure Summary     Date: 06/01/22 Room / Location:  [facility]    Anesthesia Start: 1236 Anesthesia Stop: 1254    Procedure: ESOPHAGOGASTRODUODENOSCOPY with biopsies (N/A Esophagus) Diagnosis:       Pain of upper abdomen      Dyspepsia      Gastritis      Esophageal dysmotility      (Pain of upper abdomen [R10.10])      (Dyspepsia [R10.13])    Surgeons: Federico Pavon MD Provider: Asaf Membreno MD    Anesthesia Type: MAC ASA Status: 2          Anesthesia Type: MAC    Vitals  Vitals Value Taken Time   /72 06/01/22 1302   Temp     Pulse 61 06/01/22 1302   Resp 16 06/01/22 1302   SpO2 100 % 06/01/22 1302           Post Anesthesia Care and Evaluation    Patient location during evaluation: bedside  Patient participation: complete - patient participated  Level of consciousness: awake and alert  Pain management: adequate  Airway patency: patent  Anesthetic complications: No anesthetic complications    Cardiovascular status: acceptable  Respiratory status: acceptable  Hydration status: acceptable    Comments: /72 (BP Location: Left arm, Patient Position: Lying)   Pulse 61   Temp 36.1 °C (97 °F) (Oral)   Resp 16   Ht 165.1 cm (65\")   Wt 91.7 kg (202 lb 1.6 oz)   LMP 12/09/2018   SpO2 100%   BMI 33.63 kg/m²       "

## 2022-06-02 LAB
LAB AP CASE REPORT: NORMAL
LAB AP CLINICAL INFORMATION: NORMAL
LAB AP DIAGNOSIS COMMENT: NORMAL
PATH REPORT.FINAL DX SPEC: NORMAL
PATH REPORT.GROSS SPEC: NORMAL

## 2022-06-08 DIAGNOSIS — K29.00 ACUTE GASTRITIS WITHOUT HEMORRHAGE, UNSPECIFIED GASTRITIS TYPE: ICD-10-CM

## 2022-06-08 RX ORDER — SUCRALFATE 1 G/1
TABLET ORAL
Qty: 40 TABLET | Refills: 1 | Status: SHIPPED | OUTPATIENT
Start: 2022-06-08 | End: 2022-11-28

## 2022-06-09 ENCOUNTER — OFFICE VISIT (OUTPATIENT)
Dept: GASTROENTEROLOGY | Facility: CLINIC | Age: 52
End: 2022-06-09

## 2022-06-09 VITALS
HEIGHT: 64 IN | HEART RATE: 71 BPM | WEIGHT: 199.8 LBS | DIASTOLIC BLOOD PRESSURE: 82 MMHG | TEMPERATURE: 97.7 F | BODY MASS INDEX: 34.11 KG/M2 | OXYGEN SATURATION: 99 % | SYSTOLIC BLOOD PRESSURE: 127 MMHG

## 2022-06-09 DIAGNOSIS — K21.00 GASTROESOPHAGEAL REFLUX DISEASE WITH ESOPHAGITIS WITHOUT HEMORRHAGE: Primary | ICD-10-CM

## 2022-06-09 DIAGNOSIS — K59.00 CONSTIPATION, UNSPECIFIED CONSTIPATION TYPE: ICD-10-CM

## 2022-06-09 DIAGNOSIS — R11.0 NAUSEA: ICD-10-CM

## 2022-06-09 DIAGNOSIS — R10.10 PAIN OF UPPER ABDOMEN: ICD-10-CM

## 2022-06-09 DIAGNOSIS — K29.00 ACUTE SUPERFICIAL GASTRITIS WITHOUT HEMORRHAGE: ICD-10-CM

## 2022-06-09 DIAGNOSIS — R68.81 EARLY SATIETY: ICD-10-CM

## 2022-06-09 PROCEDURE — 99214 OFFICE O/P EST MOD 30 MIN: CPT | Performed by: NURSE PRACTITIONER

## 2022-06-09 NOTE — PROGRESS NOTES
Chief Complaint   Patient presents with   • Abdominal Pain   • Nausea       HPI    Breanne Garrido is a  52 y.o. female here for a follow up visit for abdominal pain.    This patient follows with Dr. Pavon and myself.    Work-up has included negative celiac labs.  MRCP normal other than small liver cyst and moderate stool burden.    CT of the abdomen without contrast: Status post cholecystectomy. Small to moderate amount of stool in the colon.  Recent hemogram normal.  Recent  otherwise normal liver function test.    EGD reviewed 6/1/2022 -- Abnormal esophageal motility, erythematous mucosa in the antrum, normal duodenum.  Pathology + esophagitis and gastritis.  No H. pylori, Pete's, or celiac disease.    Patient reports some mild improvement in symptoms since last office visit.  She still has upper abdominal pain described as an aching sensation that comes and goes with mild nausea and intermittent early satiety.  No vomiting.  No dysphagia or odynophagia.    She is currently taking Protonix 40 mg p.o. twice daily with Carafate 4 times daily.    She still struggles with constipation.  She is using over-the-counter laxatives.  If she does not take laxatives she will not have a bowel movement.  She took laxatives twice last week and had a 2 bowel movements during that week.  No rectal bleeding.  She still needs to have a colonoscopy but this was delayed as patient did not feel like she could tolerate bowel prep.    Past Medical History:   Diagnosis Date   • Allergic    • Asthma    • GERD (gastroesophageal reflux disease)        Past Surgical History:   Procedure Laterality Date   • BREAST SURGERY     • CHOLECYSTECTOMY     • CHOLECYSTECTOMY     • ENDOSCOPY N/A 6/1/2022    Procedure: ESOPHAGOGASTRODUODENOSCOPY with biopsies;  Surgeon: Federico Pavon MD;  Location: [facility] ENDOSCOPY;  Service: Gastroenterology;  Laterality: N/A;  pre-dyspepsia, abd pain  post-gastritis, abnormal contractions   • UPPER  GASTROINTESTINAL ENDOSCOPY N/A 2017       Scheduled Meds:     Continuous Infusions: No current facility-administered medications for this visit.      PRN Meds:     No Known Allergies    Social History     Socioeconomic History   • Marital status: Single   Tobacco Use   • Smoking status: Former Smoker     Years: 25.00     Quit date: 2013     Years since quittin.0   • Smokeless tobacco: Never Used   Vaping Use   • Vaping Use: Never used   Substance and Sexual Activity   • Alcohol use: Never   • Drug use: Never   • Sexual activity: Not Currently     Partners: Male     Birth control/protection: Abstinence       Family History   Problem Relation Age of Onset   • Hyperlipidemia Father    • Hypertension Father    • Colon cancer Maternal Aunt    • Heart disease Maternal Grandmother    • Heart disease Paternal Grandmother    • Heart disease Paternal Grandfather    • Colon polyps Mother    • Cirrhosis Sister    • Alcohol abuse Sister    • Colon cancer Maternal Aunt    • Colon polyps Sister        Review of Systems   Constitutional: Negative for activity change, appetite change, fatigue, fever and unexpected weight change.   HENT: Negative for trouble swallowing.    Respiratory: Negative for apnea, cough, choking, chest tightness, shortness of breath and wheezing.    Cardiovascular: Negative for chest pain, palpitations and leg swelling.   Gastrointestinal: Positive for abdominal pain, constipation and nausea. Negative for abdominal distention, anal bleeding, blood in stool, diarrhea, rectal pain and vomiting.       Vitals:    22 0854   BP: 127/82   Pulse: 71   Temp: 97.7 °F (36.5 °C)   SpO2: 99%       Physical Exam  Constitutional:       Appearance: She is well-developed.   Abdominal:      General: Bowel sounds are normal. There is no distension.      Palpations: Abdomen is soft. There is no mass.      Tenderness: There is abdominal tenderness. There is no guarding.      Hernia: No hernia is present.   Skin:      General: Skin is warm and dry.      Capillary Refill: Capillary refill takes less than 2 seconds.   Neurological:      Mental Status: She is alert and oriented to person, place, and time.   Psychiatric:         Behavior: Behavior normal.     Assessment    Diagnoses and all orders for this visit:    1. Gastroesophageal reflux disease with esophagitis without hemorrhage (Primary)  -     NM Gastric Emptying; Future    2. Acute superficial gastritis without hemorrhage    3. Pain of upper abdomen  Overview:  Added automatically from request for surgery [id number]    Orders:  -     NM Gastric Emptying; Future    4. Nausea  -     NM Gastric Emptying; Future    5. Early satiety  -     NM Gastric Emptying; Future    6. Constipation, unspecified constipation type    Other orders  -     linaclotide (Linzess) 72 MCG capsule capsule; Take 1 capsule by mouth Every Morning Before Breakfast for 14 days.  Dispense: 14 capsule; Refill: 0       Plan    Pleasant 52-year-old female seen today in follow-up status post endoscopic examination with Dr. Pavon as outlined above.    Reviewed procedural findings as well as pathology with the patient all questions were answered.    At this point recommend gastric emptying study rule out gastroparesis given continued symptoms of abdominal pain, nausea, early satiety, and evidence of esophagitis on EGD.    Recommend continuation of Protonix 40 mg p.o. twice daily.  Patient can reduce Carafate down to twice daily dosing which is likely exacerbating underlying constipation.    Regarding constipation, recommend trial of low-dose Linzess, samples provided to the patient.    Patient to trial Linzess for 2 weeks then call me or see me a [email or web address] message updating me on her bowel pattern.  Once constipation has been treated we will schedule her for a colonoscopy.         AMMY Starks  [facility]  [address] [place] [address]  Office: [phone]

## 2022-06-13 ENCOUNTER — TELEPHONE (OUTPATIENT)
Dept: GASTROENTEROLOGY | Facility: CLINIC | Age: 52
End: 2022-06-13

## 2022-06-13 NOTE — TELEPHONE ENCOUNTER
----- Message from Federico Pavon MD sent at 6/13/2022  3:22 PM EDT -----  Biopsies with gastritis negative for eosinophilic esophagitis.  Continue PPI and follow-up symptoms.

## 2022-06-22 ENCOUNTER — TELEPHONE (OUTPATIENT)
Dept: GASTROENTEROLOGY | Facility: CLINIC | Age: 52
End: 2022-06-22

## 2022-06-22 NOTE — TELEPHONE ENCOUNTER
Had Misitie Celestine APRN sign note  Faxed to Maribel SHEFFIELD [phone]   Confirmation recvd  Patient notified

## 2022-06-24 ENCOUNTER — HOSPITAL ENCOUNTER (OUTPATIENT)
Dept: NUCLEAR MEDICINE | Facility: HOSPITAL | Age: 52
Discharge: HOME | End: 2022-06-24

## 2022-06-24 DIAGNOSIS — R10.10 PAIN OF UPPER ABDOMEN: ICD-10-CM

## 2022-06-24 DIAGNOSIS — K21.00 GASTROESOPHAGEAL REFLUX DISEASE WITH ESOPHAGITIS WITHOUT HEMORRHAGE: ICD-10-CM

## 2022-06-24 DIAGNOSIS — R68.81 EARLY SATIETY: ICD-10-CM

## 2022-06-24 DIAGNOSIS — R11.0 NAUSEA: ICD-10-CM

## 2022-06-24 PROCEDURE — 78264 GASTRIC EMPTYING IMG STUDY: CPT

## 2022-06-24 PROCEDURE — A9541 TC99M SULFUR COLLOID: HCPCS | Performed by: NURSE PRACTITIONER

## 2022-06-24 PROCEDURE — 0 TECHNETIUM SULFUR COLLOID: Performed by: NURSE PRACTITIONER

## 2022-06-24 RX ADMIN — TECHNETIUM TC 99M SULFUR COLLOID 1 DOSE: KIT at 07:54

## 2022-06-28 ENCOUNTER — TELEPHONE (OUTPATIENT)
Dept: GASTROENTEROLOGY | Facility: CLINIC | Age: 52
End: 2022-06-28

## 2022-06-28 NOTE — TELEPHONE ENCOUNTER
----- Message from AMMY Matson sent at 6/27/2022 10:16 AM EDT -----  Please call the patient and let her know her gastric emptying study did show delay.  Normal is 10% or less and she came in at 29%.  She can discuss further with Janet at her next office follow-up.  Thanks

## 2022-06-28 NOTE — TELEPHONE ENCOUNTER
Called pt and advised of Lydia SWENSON's note.  Pt verbalized understanding. Pt has appt tomorrow with MESSI Gonzales.

## 2022-06-29 ENCOUNTER — OFFICE VISIT (OUTPATIENT)
Dept: GASTROENTEROLOGY | Facility: CLINIC | Age: 52
End: 2022-06-29

## 2022-06-29 VITALS
SYSTOLIC BLOOD PRESSURE: 124 MMHG | DIASTOLIC BLOOD PRESSURE: 83 MMHG | HEART RATE: 71 BPM | TEMPERATURE: 96.7 F | BODY MASS INDEX: 34.86 KG/M2 | HEIGHT: 64 IN | WEIGHT: 204.2 LBS

## 2022-06-29 DIAGNOSIS — K21.00 GASTROESOPHAGEAL REFLUX DISEASE WITH ESOPHAGITIS WITHOUT HEMORRHAGE: ICD-10-CM

## 2022-06-29 DIAGNOSIS — K31.84 GASTROPARESIS: Primary | ICD-10-CM

## 2022-06-29 DIAGNOSIS — Z12.11 ENCOUNTER FOR SCREENING FOR MALIGNANT NEOPLASM OF COLON: ICD-10-CM

## 2022-06-29 DIAGNOSIS — K59.01 SLOW TRANSIT CONSTIPATION: ICD-10-CM

## 2022-06-29 PROCEDURE — 99214 OFFICE O/P EST MOD 30 MIN: CPT | Performed by: NURSE PRACTITIONER

## 2022-06-29 RX ORDER — AZELASTINE HYDROCHLORIDE 137 UG/1
SPRAY, METERED NASAL
COMMUNITY
Start: 2022-06-18

## 2022-06-29 RX ORDER — ALBUTEROL SULFATE 90 UG/1
AEROSOL, METERED RESPIRATORY (INHALATION) AS NEEDED
COMMUNITY
Start: 2022-05-25

## 2022-06-29 RX ORDER — ALBUTEROL SULFATE 2.5 MG/3ML
SOLUTION RESPIRATORY (INHALATION) AS NEEDED
COMMUNITY
Start: 2022-05-25

## 2022-06-29 NOTE — PROGRESS NOTES
Chief Complaint   Patient presents with   • Abdominal Pain       HPI    Breanne Garrido is a  52 y.o. female here for a follow up visit for abdominal pain.    Patient follows with Dr. Pavon and myself.    Work-up has included negative celiac labs.  MRCP normal other than small liver cyst and moderate stool burden.     CT of the abdomen without contrast: Status post cholecystectomy. Small to moderate amount of stool in the colon.  Recent hemogram normal.  Recent  otherwise normal liver function test.     EGD reviewed 6/1/2022 -- Abnormal esophageal motility, erythematous mucosa in the antrum, normal duodenum.  Pathology + esophagitis and gastritis.  No H. pylori, Pete's, or celiac disease.    GES 6/24/2022 --delayed imaging.  Residual activity at 4 hours is 29%.    Today she reports some improvement in symptoms.  She is as good and bad days.  She has not trialed gastroparesis diet yet given recent diagnosis of gastroparesis.  Still gets upper abdominal discomfort, bloating, gas but denies breakthrough heartburn.  She taking Protonix 40 mg p.o. twice daily.  Carafate p.o. twice daily.  And recent initiation of Linzess 72 mcg once daily has resolved constipation.    She has never had a colonoscopy and feels like she is ready to schedule a screening colonoscopy with Dr. Pavon.     Past Medical History:   Diagnosis Date   • Allergic    • Asthma    • GERD (gastroesophageal reflux disease)        Past Surgical History:   Procedure Laterality Date   • BREAST SURGERY     • CHOLECYSTECTOMY     • CHOLECYSTECTOMY     • ENDOSCOPY N/A 6/1/2022    Procedure: ESOPHAGOGASTRODUODENOSCOPY with biopsies;  Surgeon: Federico Pavon MD;  Location: [facility] ENDOSCOPY;  Service: Gastroenterology;  Laterality: N/A;  pre-dyspepsia, abd pain  post-gastritis, abnormal contractions   • UPPER GASTROINTESTINAL ENDOSCOPY N/A 2017       Scheduled Meds:     Continuous Infusions: No current facility-administered medications for this  visit.      PRN Meds:     No Known Allergies    Social History     Socioeconomic History   • Marital status: Single   Tobacco Use   • Smoking status: Former Smoker     Years: 25.00     Quit date: 2013     Years since quittin.1   • Smokeless tobacco: Never Used   Vaping Use   • Vaping Use: Never used   Substance and Sexual Activity   • Alcohol use: Never   • Drug use: Never   • Sexual activity: Not Currently     Partners: Male     Birth control/protection: Abstinence       Family History   Problem Relation Age of Onset   • Hyperlipidemia Father    • Hypertension Father    • Colon cancer Maternal Aunt    • Heart disease Maternal Grandmother    • Heart disease Paternal Grandmother    • Heart disease Paternal Grandfather    • Colon polyps Mother    • Cirrhosis Sister    • Alcohol abuse Sister    • Colon cancer Maternal Aunt    • Colon polyps Sister        Review of Systems   Constitutional: Negative for activity change, appetite change, fatigue, fever and unexpected weight change.   HENT: Negative for trouble swallowing.    Respiratory: Negative for apnea, cough, choking, chest tightness, shortness of breath and wheezing.    Cardiovascular: Negative for chest pain, palpitations and leg swelling.   Gastrointestinal: Positive for abdominal pain and constipation. Negative for abdominal distention, anal bleeding, blood in stool, diarrhea, nausea, rectal pain and vomiting.       Vitals:    22 1052   BP: 124/83   Pulse: 71   Temp: 96.7 °F (35.9 °C)       Physical Exam  Constitutional:       Appearance: She is well-developed.   Abdominal:      General: Bowel sounds are normal. There is no distension.      Palpations: Abdomen is soft. There is no mass.      Tenderness: There is no abdominal tenderness. There is no guarding.      Hernia: No hernia is present.   Skin:     General: Skin is warm and dry.      Capillary Refill: Capillary refill takes less than 2 seconds.   Neurological:      Mental Status: She is  alert and oriented to person, place, and time.   Psychiatric:         Behavior: Behavior normal.       Assessment    Diagnoses and all orders for this visit:    1. Gastroparesis (Primary)  -     Ambulatory Referral to Nutrition Services    2. Gastroesophageal reflux disease with esophagitis without hemorrhage    3. Slow transit constipation    4. Encounter for screening for malignant neoplasm of colon  -     Case Request; Standing  -     Case Request       Plan    Strict gastroparesis diet, written instructions provided to the patient  Referral to nutritional services to elaborate on dietary changes as well  Continue twice daily dosing PPI therapy  Stop Carafate  Continue low-dose Linzess  Arrange breath testing rule SIBO which is common in patients with slow transit constipation and gastroparesis  Schedule screening colonoscopy with Dr. Pavon this fall         AMMY Starks  [facility]  [address] [place] [address]  Office: [phone]

## 2022-06-30 ENCOUNTER — TELEPHONE (OUTPATIENT)
Dept: GASTROENTEROLOGY | Facility: CLINIC | Age: 52
End: 2022-06-30

## 2022-08-26 ENCOUNTER — APPOINTMENT (OUTPATIENT)
Dept: DIABETES SERVICES | Facility: HOSPITAL | Age: 52
End: 2022-08-26

## 2022-09-13 ENCOUNTER — TELEPHONE (OUTPATIENT)
Dept: GASTROENTEROLOGY | Facility: CLINIC | Age: 52
End: 2022-09-13

## 2022-09-13 PROBLEM — Z12.11 ENCOUNTER FOR SCREENING FOR MALIGNANT NEOPLASM OF COLON: Status: ACTIVE | Noted: 2022-09-13

## 2022-09-13 NOTE — TELEPHONE ENCOUNTER
CATE Rm  for COLONOSCOPY on 10/17/22 arrive at 945am.Prep instructions mailed to address on file.

## 2022-09-29 ENCOUNTER — HOSPITAL ENCOUNTER (OUTPATIENT)
Dept: DIABETES SERVICES | Facility: HOSPITAL | Age: 52
Discharge: HOME | End: 2022-09-29
Admitting: NURSE PRACTITIONER

## 2022-09-29 PROCEDURE — 97802 MEDICAL NUTRITION INDIV IN: CPT

## 2022-09-29 NOTE — CONSULTS
"Mrs. Garrido met with Registered Dietitian for MNT/diet education for gastroparesis. A comprehensive assessment/training record has been sent to medical records (see media tab).    GES from 6/22 shows 29% residual at 4 hours. Does well with protein smoothies. Will do that for a few days until she gets too hungry then eats \"normal food\" and feels poorly. [facility] diet reviewed. Discussed textures of food and smaller more frequent eating occurences.     Mrs. Garrido has been encouraged to call our office with questions or additional education needs. Please place referral for additional services or follow-up should need arise.     Thank you for the referral.    "

## 2022-10-11 ENCOUNTER — TELEPHONE (OUTPATIENT)
Dept: GASTROENTEROLOGY | Facility: CLINIC | Age: 52
End: 2022-10-11

## 2022-10-17 RX ORDER — LINACLOTIDE 72 UG/1
CAPSULE, GELATIN COATED ORAL
Qty: 90 CAPSULE | Refills: 3 | Status: SHIPPED | OUTPATIENT
Start: 2022-10-17

## 2022-10-27 RX ORDER — AZITHROMYCIN 200 MG/5ML
200 POWDER, FOR SUSPENSION ORAL DAILY
Qty: 150 ML | Refills: 11 | Status: SHIPPED | OUTPATIENT
Start: 2022-10-27 | End: 2022-11-29

## 2022-11-03 NOTE — TELEPHONE ENCOUNTER
[facility]ON[facility]COPY on 12/9/22  arrive at 945am.Prep instructions mailed to verified address.

## 2022-11-23 ENCOUNTER — TELEPHONE (OUTPATIENT)
Dept: GASTROENTEROLOGY | Facility: CLINIC | Age: 52
End: 2022-11-23

## 2022-11-23 NOTE — TELEPHONE ENCOUNTER
CATE patient via telephone for. Scheduled 01/23/2023 with arrival time of 0730A. Prep paperwork mailed to verified address on file. Patient advised arrival time may change based on [facility] guidelines. CATE GARCIA

## 2022-11-23 NOTE — TELEPHONE ENCOUNTER
----- Message from Breanne Garrido sent at 11/23/2022  8:17 AM EST -----  Regarding: Rescheduling colonoscopy   Contact: [phone]  Good morning, I am seeking help in rescheduling my colonoscopy. Would you be able to help me get it rescheduled from December 9th to any day the week of December 26th? I appreciate any help you can give me.

## 2022-11-23 NOTE — TELEPHONE ENCOUNTER
CATE patient via telephone for. Scheduled 01/23/2023 with arrival time of 0730A. Prep paperwork mailed to verified address on file. Patient advised arrival time may change based on formerly [facility] guidelines. CATE ESPOSITO patient via telephone for. Scheduled 01/23/2023 with arrival time of 0730A. Prep paperwork mailed to verified address on file. Patient advised arrival time may change based on formerly [facility] guidelines. CATE GARCIA

## 2022-11-28 NOTE — PROGRESS NOTES
"Subjective   Breanne Garrido is a 52 y.o. female.     CC: \"Prickly Sensation\"/Joint Discomfort    History of Present Illness     Pt comes in today describing a \"prickly sensation\" of the body, somewhat globally, over the past 2 weeks w/o prior h/o such. In discussion, she reports not feeling well for several years now, suffering from Gastroparesis (sees GI) and has Raynaud's Phenomena. She reports recently also developing joint aches/stiffness w/o swelling/redness/warmth and some rashes of the skin and fatigue.      The following portions of the patient's history were reviewed and updated as appropriate: allergies, current medications, past family history, past medical history, past social history, past surgical history and problem list.    Review of Systems   Constitutional: Positive for fatigue. Negative for activity change, chills and fever.   Respiratory: Negative for cough.    Cardiovascular: Negative for chest pain.   Musculoskeletal: Positive for arthralgias.   Neurological: Positive for numbness (\"prickly sensation\"). Negative for weakness.   Psychiatric/Behavioral: Negative for dysphoric mood.       /83   Pulse 80   Temp 97.4 °F (36.3 °C) (Oral)   Resp 16   Ht 162.6 cm (64\")   Wt 91.6 kg (202 lb)   LMP 12/09/2018   BMI 34.67 kg/m²     Objective   Physical Exam  Constitutional:       General: She is not in acute distress.     Appearance: She is well-developed.   Pulmonary:      Effort: Pulmonary effort is normal.   Skin:     Comments: Normal today, but shows me a recent picture of her palms showing dryness and inflammation.   Neurological:      Mental Status: She is alert and oriented to person, place, and time.   Psychiatric:         Behavior: Behavior normal.         Thought Content: Thought content normal.         Assessment & Plan   Diagnoses and all orders for this visit:    1. Disturbance of skin sensation (Primary)  -     TSH  -     Vitamin B12  -     Folate  -     Ambulatory Referral to " Rheumatology    2. Raynaud's disease without gangrene  -     Ambulatory Referral to Rheumatology    3. Arthralgia, unspecified joint  -     ONI  -     Uric Acid  -     Rheumatoid Factor  -     Sedimentation Rate  -     Ambulatory Referral to Rheumatology    4. Annual physical exam  Comments:  for labs only, don't bill  Orders:  -     Comprehensive metabolic panel  -     Lipid panel  -     CBC and Differential  -     TSH

## 2022-11-29 ENCOUNTER — OFFICE VISIT (OUTPATIENT)
Dept: FAMILY MEDICINE CLINIC | Facility: CLINIC | Age: 52
End: 2022-11-29

## 2022-11-29 VITALS
HEART RATE: 80 BPM | DIASTOLIC BLOOD PRESSURE: 83 MMHG | HEIGHT: 64 IN | RESPIRATION RATE: 16 BRPM | BODY MASS INDEX: 34.49 KG/M2 | SYSTOLIC BLOOD PRESSURE: 140 MMHG | WEIGHT: 202 LBS | TEMPERATURE: 97.4 F

## 2022-11-29 DIAGNOSIS — Z00.00 ANNUAL PHYSICAL EXAM: ICD-10-CM

## 2022-11-29 DIAGNOSIS — I73.00 RAYNAUD'S DISEASE WITHOUT GANGRENE: ICD-10-CM

## 2022-11-29 DIAGNOSIS — R20.9 DISTURBANCE OF SKIN SENSATION: Primary | ICD-10-CM

## 2022-11-29 DIAGNOSIS — M25.50 ARTHRALGIA, UNSPECIFIED JOINT: ICD-10-CM

## 2022-11-29 PROBLEM — K31.84 GASTROPARESIS: Status: ACTIVE | Noted: 2022-11-29

## 2022-11-29 PROCEDURE — 99214 OFFICE O/P EST MOD 30 MIN: CPT | Performed by: FAMILY MEDICINE

## 2022-11-29 RX ORDER — AZITHROMYCIN 200 MG/5ML
POWDER, FOR SUSPENSION ORAL DAILY
COMMUNITY
End: 2023-01-23 | Stop reason: HOSPADM

## 2022-11-30 LAB
ALBUMIN SERPL-MCNC: 4.2 G/DL (ref 3.5–5.2)
ALBUMIN/GLOB SERPL: 1.5 G/DL
ALP SERPL-CCNC: 88 U/L (ref 39–117)
ALT SERPL-CCNC: 31 U/L (ref 1–33)
ANA SER QL: NEGATIVE
AST SERPL-CCNC: 39 U/L (ref 1–32)
BASOPHILS # BLD AUTO: 0.02 10*3/MM3 (ref 0–0.2)
BASOPHILS NFR BLD AUTO: 0.5 % (ref 0–1.5)
BILIRUB SERPL-MCNC: 0.3 MG/DL (ref 0–1.2)
BUN SERPL-MCNC: 19 MG/DL (ref 6–20)
BUN/CREAT SERPL: 26.8 (ref 7–25)
CALCIUM SERPL-MCNC: 9.4 MG/DL (ref 8.6–10.5)
CHLORIDE SERPL-SCNC: 101 MMOL/L (ref 98–107)
CHOLEST SERPL-MCNC: 200 MG/DL (ref 0–200)
CO2 SERPL-SCNC: 26.6 MMOL/L (ref 22–29)
CREAT SERPL-MCNC: 0.71 MG/DL (ref 0.57–1)
EGFRCR SERPLBLD CKD-EPI 2021: 102.5 ML/MIN/1.73
EOSINOPHIL # BLD AUTO: 0.13 10*3/MM3 (ref 0–0.4)
EOSINOPHIL NFR BLD AUTO: 3.3 % (ref 0.3–6.2)
ERYTHROCYTE [DISTWIDTH] IN BLOOD BY AUTOMATED COUNT: 12.6 % (ref 12.3–15.4)
ERYTHROCYTE [SEDIMENTATION RATE] IN BLOOD BY WESTERGREN METHOD: 10 MM/HR (ref 0–30)
FOLATE SERPL-MCNC: 18.9 NG/ML (ref 4.78–24.2)
GLOBULIN SER CALC-MCNC: 2.8 GM/DL
GLUCOSE SERPL-MCNC: 89 MG/DL (ref 65–99)
HCT VFR BLD AUTO: 38.1 % (ref 34–46.6)
HDLC SERPL-MCNC: 80 MG/DL (ref 40–60)
HGB BLD-MCNC: 12.6 G/DL (ref 12–15.9)
IMM GRANULOCYTES # BLD AUTO: 0 10*3/MM3 (ref 0–0.05)
IMM GRANULOCYTES NFR BLD AUTO: 0 % (ref 0–0.5)
LDLC SERPL CALC-MCNC: 111 MG/DL (ref 0–100)
LYMPHOCYTES # BLD AUTO: 1.99 10*3/MM3 (ref 0.7–3.1)
LYMPHOCYTES NFR BLD AUTO: 51.2 % (ref 19.6–45.3)
MCH RBC QN AUTO: 28.7 PG (ref 26.6–33)
MCHC RBC AUTO-ENTMCNC: 33.1 G/DL (ref 31.5–35.7)
MCV RBC AUTO: 86.8 FL (ref 79–97)
MONOCYTES # BLD AUTO: 0.36 10*3/MM3 (ref 0.1–0.9)
MONOCYTES NFR BLD AUTO: 9.3 % (ref 5–12)
NEUTROPHILS # BLD AUTO: 1.39 10*3/MM3 (ref 1.7–7)
NEUTROPHILS NFR BLD AUTO: 35.7 % (ref 42.7–76)
NRBC BLD AUTO-RTO: 0 /100 WBC (ref 0–0.2)
PLATELET # BLD AUTO: 273 10*3/MM3 (ref 140–450)
POTASSIUM SERPL-SCNC: 4 MMOL/L (ref 3.5–5.2)
PROT SERPL-MCNC: 7 G/DL (ref 6–8.5)
RBC # BLD AUTO: 4.39 10*6/MM3 (ref 3.77–5.28)
RHEUMATOID FACT SERPL-ACNC: <10 IU/ML
SODIUM SERPL-SCNC: 139 MMOL/L (ref 136–145)
TRIGL SERPL-MCNC: 46 MG/DL (ref 0–150)
TSH SERPL DL<=0.005 MIU/L-ACNC: 2.41 UIU/ML (ref 0.27–4.2)
URATE SERPL-MCNC: 3.6 MG/DL (ref 2.4–5.7)
VIT B12 SERPL-MCNC: 1321 PG/ML (ref 211–946)
VLDLC SERPL CALC-MCNC: 9 MG/DL (ref 5–40)
WBC # BLD AUTO: 3.89 10*3/MM3 (ref 3.4–10.8)

## 2022-12-01 ENCOUNTER — TELEPHONE (OUTPATIENT)
Dept: GASTROENTEROLOGY | Facility: CLINIC | Age: 52
End: 2022-12-01

## 2022-12-01 NOTE — TELEPHONE ENCOUNTER
CATE patient via telephone for. Scheduled 01/23/2023 with arrival time of 0900AM. Prep paperwork mailed to verified address on file. Patient advised arrival time may change based on [facility] guidelines. CATE GARCIA

## 2022-12-28 NOTE — PROGRESS NOTES
"Subjective   Breanne Garrido is a 52 y.o. female.     CC: Chest Tightness/\"Fluttering\"    History of Present Illness     Pt comes in today reporting a > 1 week h/o off/on \"fluttering\" in the chest that can last for varying amount of times, and leads to some mild dyspnea. Exercises on the treadmill w/o difficulty.   FH: no CAD reported      The following portions of the patient's history were reviewed and updated as appropriate: allergies, current medications, past family history, past medical history, past social history, past surgical history and problem list.    Review of Systems   Constitutional: Negative for activity change, chills and fever.   Respiratory: Negative for cough.    Cardiovascular: Positive for chest pain (\"tightness\") and palpitations.   Psychiatric/Behavioral: Negative for dysphoric mood.       /84   Pulse 52   Temp 98.3 °F (36.8 °C)   Resp 18   Ht 162.6 cm (64\")   Wt 93 kg (205 lb)   LMP 12/09/2018   SpO2 100%   BMI 35.19 kg/m²     Objective   Physical Exam  Constitutional:       General: She is not in acute distress.     Appearance: She is well-developed.   Cardiovascular:      Rate and Rhythm: Normal rate and regular rhythm.   Pulmonary:      Effort: Pulmonary effort is normal.      Breath sounds: Normal breath sounds.   Neurological:      Mental Status: She is alert and oriented to person, place, and time.   Psychiatric:         Behavior: Behavior normal.         Thought Content: Thought content normal.     Labs reviewed with pt today during visit. All questions answered.      Assessment & Plan   Diagnoses and all orders for this visit:    1. Heart palpitations (Primary)  -     Ambulatory Referral to Cardiology               "

## 2022-12-29 ENCOUNTER — OFFICE VISIT (OUTPATIENT)
Dept: FAMILY MEDICINE CLINIC | Facility: CLINIC | Age: 52
End: 2022-12-29

## 2022-12-29 VITALS
HEIGHT: 64 IN | SYSTOLIC BLOOD PRESSURE: 130 MMHG | BODY MASS INDEX: 35 KG/M2 | TEMPERATURE: 98.3 F | DIASTOLIC BLOOD PRESSURE: 84 MMHG | HEART RATE: 52 BPM | WEIGHT: 205 LBS | RESPIRATION RATE: 18 BRPM | OXYGEN SATURATION: 100 %

## 2022-12-29 DIAGNOSIS — R00.2 HEART PALPITATIONS: Primary | ICD-10-CM

## 2022-12-29 PROCEDURE — 99213 OFFICE O/P EST LOW 20 MIN: CPT | Performed by: FAMILY MEDICINE

## 2022-12-29 NOTE — PROGRESS NOTES
RM:________    Referral Provider: Vincent Louis MD Wheeler, James, MD    NEW PATIENT/ CONSULT  PREVIOUS CARDIOLOGIST:   CARDIAC TESTING:     : 1970   AGE: 52 y.o.    2022  REASON FOR VISIT/  CC:      WT: ____________ BP: __________L __________R/ HR_______________    CHEST PAIN: _____________    SOA: ____________PALPS: __________      LIGHTHEADED: ___________ FATIGUE: _______________ EDEMA______________  ALLERGIES:  Patient has no known allergies.  SMOKING HISTORY  Social History     Tobacco Use   • Smoking status: Former     Years: 25.00     Types: Cigarettes     Quit date: 2013     Years since quittin.6   • Smokeless tobacco: Never   Vaping Use   • Vaping Use: Never used   Substance Use Topics   • Alcohol use: Never   • Drug use: Never     Single     CHILDREN: _______________________       CAFFEINE USE________  ALCOHOL _____________  OCCUPATION_____________  Past Surgical History:   Procedure Laterality Date   • BREAST SURGERY     • CHOLECYSTECTOMY     • CHOLECYSTECTOMY     • ENDOSCOPY N/A 2022    Procedure: ESOPHAGOGASTRODUODENOSCOPY with biopsies;  Surgeon: Federico Pavon MD;  Location: [facility] ENDOSCOPY;  Service: Gastroenterology;  Laterality: N/A;  pre-dyspepsia, abd pain  post-gastritis, abnormal contractions   • UPPER GASTROINTESTINAL ENDOSCOPY N/A                 FAMILY HISTORY  HEART DISEASE  CHF  DIABETES  CARDIAC ARREST  STROKE  CANCER  ANEURYSM                                                             H/O: MI_____   STROKE________   GOUT_____   ANEMIA______     CAROTID________ HIV____ CAD_______ HYPERCHOL _____    H/O: CHF _____   RF____ DM___ HTN_______PVD____THYROID DISEASE_______    PMH: GI ____   HEPATITIS ___ KIDNEY DISEASE ___ LUNG DISEASE _______     SLEEP APNEA ____ BLOOD CLOTS ____ DVT ____ VEIN STRIPPING ___________     CANCER _________________________________ CHEMO/ RADIATION__________

## 2022-12-30 ENCOUNTER — OFFICE VISIT (OUTPATIENT)
Dept: CARDIOLOGY | Facility: CLINIC | Age: 52
End: 2022-12-30

## 2022-12-30 VITALS
SYSTOLIC BLOOD PRESSURE: 120 MMHG | HEIGHT: 64 IN | WEIGHT: 204.8 LBS | DIASTOLIC BLOOD PRESSURE: 84 MMHG | BODY MASS INDEX: 34.97 KG/M2 | HEART RATE: 65 BPM

## 2022-12-30 DIAGNOSIS — R00.2 PALPITATIONS: Primary | ICD-10-CM

## 2022-12-30 PROCEDURE — 99244 OFF/OP CNSLTJ NEW/EST MOD 40: CPT | Performed by: INTERNAL MEDICINE

## 2022-12-30 PROCEDURE — 93000 ELECTROCARDIOGRAM COMPLETE: CPT | Performed by: INTERNAL MEDICINE

## 2022-12-30 NOTE — PROGRESS NOTES
"Date of Office Visit: 22  Encounter Provider: Chris Hagan MD  Place of Service: [facility] CARDIOLOGY  Patient Name: Breanne Garrido  :1970    Chief Complaint   Patient presents with   • Palpitations   • Shortness of Breath   :     HPI:     Ms. Garrido is 52 y.o. and presents today in consultation for palpitations at the request of Dr Louis.     She is a healthy young woman with no significant cardiovascular risk factors.  For the last 2 weeks, she has noticed \"flutters\" which feel like a skipped beat in her neck.  She notices them when she is at rest.  She has not had any chest discomfort or exertional shortness of breath.  She has not had any sustained tachyarrhythmia.  She has not had any syncope or near syncope.      Past Medical History:   Diagnosis Date   • Asthma    • GERD (gastroesophageal reflux disease)        Past Surgical History:   Procedure Laterality Date   • BREAST SURGERY     • CHOLECYSTECTOMY     • CHOLECYSTECTOMY     • ENDOSCOPY N/A 2022    Procedure: ESOPHAGOGASTRODUODENOSCOPY with biopsies;  Surgeon: Federico Pavon MD;  Location: [facility] ENDOSCOPY;  Service: Gastroenterology;  Laterality: N/A;  pre-dyspepsia, abd pain  post-gastritis, abnormal contractions   • UPPER GASTROINTESTINAL ENDOSCOPY N/A        Social History     Socioeconomic History   • Marital status:    • Number of children: 1   Tobacco Use   • Smoking status: Former     Packs/day: 1.00     Years: 25.00     Pack years: 25.00     Types: Cigarettes     Quit date: 2013     Years since quittin.6   • Smokeless tobacco: Never   Vaping Use   • Vaping Use: Never used   Substance and Sexual Activity   • Alcohol use: Never   • Drug use: Never   • Sexual activity: Not Currently     Partners: Male     Birth control/protection: Abstinence       Family History   Problem Relation Age of Onset   • Colon polyps Mother    • Hyperlipidemia Father    • Hypertension Father    • " "Cirrhosis Sister    • Alcohol abuse Sister    • Colon polyps Sister    • Colon cancer Maternal Aunt    • Colon cancer Maternal Aunt    • Heart disease Maternal Grandmother    • Heart disease Paternal Grandmother    • Heart disease Paternal Grandfather        Review of Systems   Cardiovascular: Positive for palpitations.   Gastrointestinal: Positive for bloating, constipation and nausea.   All other systems reviewed and are negative.      No Known Allergies      Current Outpatient Medications:   •  albuterol (PROVENTIL) (2.5 MG/3ML) 0.083% nebulizer solution, As Needed., Disp: , Rfl:   •  albuterol sulfate  (90 Base) MCG/ACT inhaler, As Needed., Disp: , Rfl:   •  Azelastine HCl 137 MCG/SPRAY solution, , Disp: , Rfl:   •  azithromycin (ZITHROMAX) 200 MG/5ML suspension, Take  by mouth Daily. Give the patient 916 mg (23 ml) by mouth the first day then 460 mg (11 ml) by mouth daily for 4 days., Disp: , Rfl:   •  Cyanocobalamin (B-12) 1000 MCG capsule, Take  by mouth., Disp: , Rfl:   •  estradiol (MINIVELLE, VIVELLE-DOT) 0.05 MG/24HR patch, , Disp: , Rfl:   •  Linzess 72 MCG capsule capsule, TAKE ONE CAPSULE BY MOUTH EVERY MORNING BEFORE BREAKFAST, Disp: 90 capsule, Rfl: 3  •  montelukast (SINGULAIR) 10 MG tablet, Take 10 mg by mouth., Disp: , Rfl:   •  pantoprazole (PROTONIX) 40 MG EC tablet, Take 1 tablet by mouth 2 (Two) Times a Day., Disp: 180 tablet, Rfl: 3  •  Progesterone (PROMETRIUM) 100 MG capsule, Every Night., Disp: , Rfl:   •  vitamin C (ASCORBIC ACID) 250 MG tablet, Take 250 mg by mouth., Disp: , Rfl:       Objective:     Vitals:    12/30/22 1054   BP: 120/84   BP Location: Left arm   Pulse: 65   Weight: 92.9 kg (204 lb 12.8 oz)   Height: 162.6 cm (64\")     Body mass index is 35.15 kg/m².    Vitals reviewed.   Constitutional:       Appearance: Healthy appearance. Well-developed and not in distress.   Eyes:      Conjunctiva/sclera: Conjunctivae normal.   HENT:      Head: Normocephalic.      Nose: Nose " normal.         Comments: masked  Neck:      Vascular: No JVD. JVD normal.      Lymphadenopathy: No cervical adenopathy.   Pulmonary:      Effort: Pulmonary effort is normal.      Breath sounds: Normal breath sounds.   Cardiovascular:      Normal rate. Regular rhythm.      Murmurs: There is no murmur.   Pulses:     Intact distal pulses.   Edema:     Peripheral edema absent.   Abdominal:      Palpations: Abdomen is soft.      Tenderness: There is no abdominal tenderness.   Musculoskeletal: Normal range of motion.      Cervical back: Normal range of motion. Skin:     General: Skin is warm and dry.      Findings: No rash.   Neurological:      General: No focal deficit present.      Mental Status: Alert and oriented to person, place, and time.      Cranial Nerves: No cranial nerve deficit.   Psychiatric:         Behavior: Behavior normal.         Thought Content: Thought content normal.         Judgment: Judgment normal.           ECG 12 Lead    Date/Time: 12/30/2022 11:30 AM  Performed by: Chris Hagan MD  Authorized by: Chris Hagan MD   Comparison: compared with previous ECG   Similar to previous ECG  Rhythm: sinus rhythm  Conduction: conduction normal  ST Segments: ST segments normal  T Waves: T waves normal  QRS axis: normal  Other: no other findings    Clinical impression: normal ECG              Assessment:       Diagnosis Plan   1. Palpitations  Adult Transthoracic Echo Complete W/ Cont if Necessary Per Protocol             Plan:       Ms Garrido is experiencing palpitations that are most certainly benign premature ventricular contractions.  The description of her symptoms and the fact that they occur in the evening when she is lying back are strongly suggestive of this diagnosis.  I am going to get an echocardiogram just to exclude any structural heart disease.  Her EKG and her cardiac exam are normal, which is reassuring.  I empirically asked her to try magnesium oxide, 400 mg daily to see if this helps (she  is chronically constipated as well, so this may be beneficial for that as well).  If this episode of ectopic beats fails to resolve on its own, we could then proceed with rhythm monitoring to rule out anything more sinister, but I believe that is unlikely.    I did note that she recently had labs performed which indicated normal renal function, normal electrolytes, and normal TSH.    Sincerely,       Chris Hagan MD

## 2023-01-10 ENCOUNTER — TELEPHONE (OUTPATIENT)
Dept: FAMILY MEDICINE CLINIC | Facility: CLINIC | Age: 53
End: 2023-01-10

## 2023-01-10 NOTE — TELEPHONE ENCOUNTER
Caller: Breanne Garrido    Relationship: Self    Best call back number: [phone]    What is the best time to reach you: ANY TIME    Who are you requesting to speak with (clinical staff, provider,  specific staff member): CLINICAL STAFF    What was the call regarding: PATIENT REQUESTS A CALLBACK REGARDING STATUS OF REFERRAL.  SHE SAYS THE MY CHART ANSWER TO HER QUESTION WAS UNCLEAR AS TO WHETHER THE REFERRAL HAD BEEN RESENT.    Do you require a callback: YES    PLEASE ADVISE.           No lymphadedenopathy

## 2023-01-16 ENCOUNTER — HOSPITAL ENCOUNTER (OUTPATIENT)
Dept: CARDIOLOGY | Facility: HOSPITAL | Age: 53
Discharge: HOME | End: 2023-01-16
Admitting: INTERNAL MEDICINE
Payer: COMMERCIAL

## 2023-01-16 VITALS
HEIGHT: 64 IN | DIASTOLIC BLOOD PRESSURE: 80 MMHG | WEIGHT: 204 LBS | BODY MASS INDEX: 34.83 KG/M2 | HEART RATE: 75 BPM | SYSTOLIC BLOOD PRESSURE: 102 MMHG | OXYGEN SATURATION: 97 %

## 2023-01-16 DIAGNOSIS — R00.2 PALPITATIONS: ICD-10-CM

## 2023-01-16 LAB
AORTIC ARCH: 2.5 CM
ASCENDING AORTA: 2.9 CM
BH CV ECHO MEAS - ACS: 1.87 CM
BH CV ECHO MEAS - AO MAX PG: 6.1 MMHG
BH CV ECHO MEAS - AO MEAN PG: 3.8 MMHG
BH CV ECHO MEAS - AO ROOT DIAM: 2.8 CM
BH CV ECHO MEAS - AO V2 MAX: 123.5 CM/SEC
BH CV ECHO MEAS - AO V2 VTI: 28.3 CM
BH CV ECHO MEAS - AVA(I,D): 2.24 CM2
BH CV ECHO MEAS - EDV(CUBED): 83.1 ML
BH CV ECHO MEAS - EDV(MOD-SP2): 90 ML
BH CV ECHO MEAS - EDV(MOD-SP4): 62 ML
BH CV ECHO MEAS - EF(MOD-BP): 64.3 %
BH CV ECHO MEAS - EF(MOD-SP2): 70 %
BH CV ECHO MEAS - EF(MOD-SP4): 56.5 %
BH CV ECHO MEAS - ESV(CUBED): 20.3 ML
BH CV ECHO MEAS - ESV(MOD-SP2): 27 ML
BH CV ECHO MEAS - ESV(MOD-SP4): 27 ML
BH CV ECHO MEAS - FS: 37.5 %
BH CV ECHO MEAS - IVS/LVPW: 1.12 CM
BH CV ECHO MEAS - IVSD: 1.01 CM
BH CV ECHO MEAS - LAT PEAK E' VEL: 10.6 CM/SEC
BH CV ECHO MEAS - LV DIASTOLIC VOL/BSA (35-75): 31.4 CM2
BH CV ECHO MEAS - LV MASS(C)D: 137.6 GRAMS
BH CV ECHO MEAS - LV MAX PG: 3.8 MMHG
BH CV ECHO MEAS - LV MEAN PG: 2.3 MMHG
BH CV ECHO MEAS - LV SYSTOLIC VOL/BSA (12-30): 13.7 CM2
BH CV ECHO MEAS - LV V1 MAX: 97.2 CM/SEC
BH CV ECHO MEAS - LV V1 VTI: 20.9 CM
BH CV ECHO MEAS - LVIDD: 4.4 CM
BH CV ECHO MEAS - LVIDS: 2.7 CM
BH CV ECHO MEAS - LVOT AREA: 3 CM2
BH CV ECHO MEAS - LVOT DIAM: 1.96 CM
BH CV ECHO MEAS - LVPWD: 0.9 CM
BH CV ECHO MEAS - MED PEAK E' VEL: 6.9 CM/SEC
BH CV ECHO MEAS - MV A DUR: 0.11 SEC
BH CV ECHO MEAS - MV A MAX VEL: 76.2 CM/SEC
BH CV ECHO MEAS - MV DEC SLOPE: 633.9 CM/SEC2
BH CV ECHO MEAS - MV DEC TIME: 0.16 MSEC
BH CV ECHO MEAS - MV E MAX VEL: 88.1 CM/SEC
BH CV ECHO MEAS - MV E/A: 1.16
BH CV ECHO MEAS - MV MAX PG: 4 MMHG
BH CV ECHO MEAS - MV MEAN PG: 1.84 MMHG
BH CV ECHO MEAS - MV P1/2T: 46.4 MSEC
BH CV ECHO MEAS - MV V2 VTI: 28.9 CM
BH CV ECHO MEAS - MVA(P1/2T): 4.7 CM2
BH CV ECHO MEAS - MVA(VTI): 2.19 CM2
BH CV ECHO MEAS - PA ACC TIME: 0.11 SEC
BH CV ECHO MEAS - PA PR(ACCEL): 31.5 MMHG
BH CV ECHO MEAS - PA V2 MAX: 116.7 CM/SEC
BH CV ECHO MEAS - PULM A REVS DUR: 0.11 SEC
BH CV ECHO MEAS - PULM A REVS VEL: 28.1 CM/SEC
BH CV ECHO MEAS - PULM DIAS VEL: 57.2 CM/SEC
BH CV ECHO MEAS - PULM S/D: 1.11
BH CV ECHO MEAS - PULM SYS VEL: 63.5 CM/SEC
BH CV ECHO MEAS - QP/QS: 0.75
BH CV ECHO MEAS - RAP SYSTOLE: 3 MMHG
BH CV ECHO MEAS - RV MAX PG: 3.5 MMHG
BH CV ECHO MEAS - RV V1 MAX: 93.6 CM/SEC
BH CV ECHO MEAS - RV V1 VTI: 21.1 CM
BH CV ECHO MEAS - RVOT DIAM: 1.7 CM
BH CV ECHO MEAS - RVSP: 22.8 MMHG
BH CV ECHO MEAS - SI(MOD-SP2): 31.9 ML/M2
BH CV ECHO MEAS - SI(MOD-SP4): 17.7 ML/M2
BH CV ECHO MEAS - SUP REN AO DIAM: 2.2 CM
BH CV ECHO MEAS - SV(LVOT): 63.3 ML
BH CV ECHO MEAS - SV(MOD-SP2): 63 ML
BH CV ECHO MEAS - SV(MOD-SP4): 35 ML
BH CV ECHO MEAS - SV(RVOT): 47.7 ML
BH CV ECHO MEAS - TAPSE (>1.6): 2.03 CM
BH CV ECHO MEAS - TR MAX PG: 19.8 MMHG
BH CV ECHO MEAS - TR MAX VEL: 222.6 CM/SEC
BH CV ECHO MEASUREMENTS AVERAGE E/E' RATIO: 10.07
BH CV VAS BP LEFT ARM: NORMAL MMHG
BH CV XLRA - RV BASE: 2.6 CM
BH CV XLRA - RV LENGTH: 7 CM
BH CV XLRA - RV MID: 2.8 CM
BH CV XLRA - TDI S': 10 CM/SEC
LEFT ATRIUM VOLUME INDEX: 25.3 ML/M2
MAXIMAL PREDICTED HEART RATE: 168 BPM
SINUS: 2.9 CM
STJ: 2.7 CM
STRESS TARGET HR: 143 BPM

## 2023-01-16 PROCEDURE — 93306 TTE W/DOPPLER COMPLETE: CPT | Performed by: INTERNAL MEDICINE

## 2023-01-16 PROCEDURE — 93306 TTE W/DOPPLER COMPLETE: CPT

## 2023-01-16 NOTE — PROGRESS NOTES
Please let the patient know that her echo is normal.  She has normal heart squeeze and valvular function.

## 2023-01-23 ENCOUNTER — ANESTHESIA EVENT (OUTPATIENT)
Dept: GASTROENTEROLOGY | Facility: HOSPITAL | Age: 53
End: 2023-01-23
Payer: COMMERCIAL

## 2023-01-23 ENCOUNTER — ANESTHESIA (OUTPATIENT)
Dept: GASTROENTEROLOGY | Facility: HOSPITAL | Age: 53
End: 2023-01-23
Payer: COMMERCIAL

## 2023-01-23 ENCOUNTER — HOSPITAL ENCOUNTER (OUTPATIENT)
Facility: HOSPITAL | Age: 53
Setting detail: HOSPITAL OUTPATIENT SURGERY
Discharge: HOME | End: 2023-01-23
Attending: INTERNAL MEDICINE | Admitting: INTERNAL MEDICINE
Payer: COMMERCIAL

## 2023-01-23 VITALS
RESPIRATION RATE: 18 BRPM | HEART RATE: 62 BPM | BODY MASS INDEX: 34.49 KG/M2 | OXYGEN SATURATION: 96 % | SYSTOLIC BLOOD PRESSURE: 114 MMHG | WEIGHT: 207 LBS | DIASTOLIC BLOOD PRESSURE: 81 MMHG | HEIGHT: 65 IN

## 2023-01-23 DIAGNOSIS — Z12.11 ENCOUNTER FOR SCREENING FOR MALIGNANT NEOPLASM OF COLON: ICD-10-CM

## 2023-01-23 PROCEDURE — 45385 COLONOSCOPY W/LESION REMOVAL: CPT | Performed by: INTERNAL MEDICINE

## 2023-01-23 PROCEDURE — 88305 TISSUE EXAM BY PATHOLOGIST: CPT | Performed by: INTERNAL MEDICINE

## 2023-01-23 PROCEDURE — S0260 H&P FOR SURGERY: HCPCS | Performed by: INTERNAL MEDICINE

## 2023-01-23 PROCEDURE — 25010000002 PROPOFOL 10 MG/ML EMULSION: Performed by: NURSE ANESTHETIST, CERTIFIED REGISTERED

## 2023-01-23 DEVICE — DEV CLIP ENDO RESOLUTION360 CONTRL ROT 235CM: Type: IMPLANTABLE DEVICE | Site: CECUM | Status: FUNCTIONAL

## 2023-01-23 RX ORDER — PROPOFOL 10 MG/ML
VIAL (ML) INTRAVENOUS AS NEEDED
Status: DISCONTINUED | OUTPATIENT
Start: 2023-01-23 | End: 2023-01-23 | Stop reason: SURG

## 2023-01-23 RX ORDER — METOCLOPRAMIDE 5 MG/1
5 TABLET ORAL
Qty: 120 TABLET | Refills: 11 | Status: SHIPPED | OUTPATIENT
Start: 2023-01-23

## 2023-01-23 RX ORDER — PROPOFOL 10 MG/ML
VIAL (ML) INTRAVENOUS CONTINUOUS PRN
Status: DISCONTINUED | OUTPATIENT
Start: 2023-01-23 | End: 2023-01-23 | Stop reason: SURG

## 2023-01-23 RX ORDER — SODIUM CHLORIDE 0.9 % (FLUSH) 0.9 %
10 SYRINGE (ML) INJECTION EVERY 12 HOURS SCHEDULED
Status: DISCONTINUED | OUTPATIENT
Start: 2023-01-23 | End: 2023-01-23 | Stop reason: HOSPADM

## 2023-01-23 RX ORDER — SODIUM CHLORIDE 9 MG/ML
40 INJECTION, SOLUTION INTRAVENOUS AS NEEDED
Status: DISCONTINUED | OUTPATIENT
Start: 2023-01-23 | End: 2023-01-23 | Stop reason: HOSPADM

## 2023-01-23 RX ORDER — SODIUM CHLORIDE 0.9 % (FLUSH) 0.9 %
10 SYRINGE (ML) INJECTION AS NEEDED
Status: DISCONTINUED | OUTPATIENT
Start: 2023-01-23 | End: 2023-01-23 | Stop reason: HOSPADM

## 2023-01-23 RX ORDER — SODIUM CHLORIDE, SODIUM LACTATE, POTASSIUM CHLORIDE, CALCIUM CHLORIDE 600; 310; 30; 20 MG/100ML; MG/100ML; MG/100ML; MG/100ML
30 INJECTION, SOLUTION INTRAVENOUS CONTINUOUS PRN
Status: DISCONTINUED | OUTPATIENT
Start: 2023-01-23 | End: 2023-01-23 | Stop reason: HOSPADM

## 2023-01-23 RX ORDER — LIDOCAINE HYDROCHLORIDE 20 MG/ML
INJECTION, SOLUTION INFILTRATION; PERINEURAL AS NEEDED
Status: DISCONTINUED | OUTPATIENT
Start: 2023-01-23 | End: 2023-01-23 | Stop reason: SURG

## 2023-01-23 RX ADMIN — Medication 80 MG: at 10:03

## 2023-01-23 RX ADMIN — SODIUM CHLORIDE, POTASSIUM CHLORIDE, SODIUM LACTATE AND CALCIUM CHLORIDE 30 ML/HR: 600; 310; 30; 20 INJECTION, SOLUTION INTRAVENOUS at 08:08

## 2023-01-23 RX ADMIN — LIDOCAINE HYDROCHLORIDE 60 MG: 20 INJECTION, SOLUTION INFILTRATION; PERINEURAL at 10:03

## 2023-01-23 RX ADMIN — PROPOFOL 200 MCG/KG/MIN: 10 INJECTION, EMULSION INTRAVENOUS at 10:03

## 2023-01-23 RX ADMIN — SODIUM CHLORIDE, POTASSIUM CHLORIDE, SODIUM LACTATE AND CALCIUM CHLORIDE: 600; 310; 30; 20 INJECTION, SOLUTION INTRAVENOUS at 10:19

## 2023-01-23 NOTE — H&P
[facility]  Pre Procedure History & Physical    Chief Complaint:   Colonoscopy screening    Subjective     HPI:   Patient 52-year-old female with history of GERD, asthma and gastroparesis presenting for screening.  Patient reports sister with colon polyps and and aunts with colon cancer as well as mother with colon polyps here for colonoscopy.    Past Medical History:   Past Medical History:   Diagnosis Date   • Asthma    • GERD (gastroesophageal reflux disease)        Past Surgical History:  Past Surgical History:   Procedure Laterality Date   • BREAST SURGERY     • CHOLECYSTECTOMY     • ENDOSCOPY N/A 06/01/2022    Procedure: ESOPHAGOGASTRODUODENOSCOPY with biopsies;  Surgeon: Federico Pavon MD;  Location: [facility] ENDOSCOPY;  Service: Gastroenterology;  Laterality: N/A;  pre-dyspepsia, abd pain  post-gastritis, abnormal contractions   • UPPER GASTROINTESTINAL ENDOSCOPY N/A 2017       Family History:  Family History   Problem Relation Age of Onset   • Colon polyps Mother    • Hyperlipidemia Father    • Hypertension Father    • Cirrhosis Sister    • Alcohol abuse Sister    • Colon polyps Sister    • Colon cancer Maternal Aunt    • Colon cancer Maternal Aunt    • Heart disease Maternal Grandmother    • Heart disease Paternal Grandmother    • Heart disease Paternal Grandfather        Social History:   reports that she quit smoking about 9 years ago. Her smoking use included cigarettes. She has a 25.00 pack-year smoking history. She has never used smokeless tobacco. She reports that she does not drink alcohol and does not use drugs.    Medications:   Medications Prior to Admission   Medication Sig Dispense Refill Last Dose   • albuterol (PROVENTIL) (2.5 MG/3ML) 0.083% nebulizer solution As Needed.   Past Month   • albuterol sulfate  (90 Base) MCG/ACT inhaler As Needed.   Past Month   • Azelastine HCl 137 MCG/SPRAY solution    Past Week   • azithromycin (ZITHROMAX) 200 MG/5ML  "suspension Take  by mouth Daily. Give the patient 916 mg (23 ml) by mouth the first day then 460 mg (11 ml) by mouth daily for 4 days.   Past Month   • Cyanocobalamin (B-12) 1000 MCG capsule Take  by mouth.   Past Week   • estradiol (MINIVELLE, VIVELLE-DOT) 0.05 MG/24HR patch    1/23/2023   • Linzess 72 MCG capsule capsule TAKE ONE CAPSULE BY MOUTH EVERY MORNING BEFORE BREAKFAST 90 capsule 3 1/22/2023   • montelukast (SINGULAIR) 10 MG tablet Take 10 mg by mouth.   Past Month   • pantoprazole (PROTONIX) 40 MG EC tablet Take 1 tablet by mouth 2 (Two) Times a Day. 180 tablet 3 1/22/2023   • Progesterone (PROMETRIUM) 100 MG capsule Every Night.   1/22/2023   • vitamin C (ASCORBIC ACID) 250 MG tablet Take 250 mg by mouth.   Past Week       Allergies:  Patient has no known allergies.    ROS:    Pertinent items are noted in HPI     Objective     Blood pressure 121/77, pulse 64, resp. rate 16, height 165.1 cm (65\"), weight 93.9 kg (207 lb), last menstrual period 12/09/2018, SpO2 98 %.    Physical Exam   Constitutional: Pt is oriented to person, place, and time and well-developed, well-nourished, and in no distress.   Mouth/Throat: Oropharynx is clear and moist.   Neck: Normal range of motion.   Cardiovascular: Normal rate, regular rhythm and normal heart sounds.    Pulmonary/Chest: Effort normal and breath sounds normal.   Abdominal: Soft. Nontender  Skin: Skin is warm and dry.   Psychiatric: Mood, memory, affect and judgment normal.     Assessment & Plan     Diagnosis:  Family history colon cancer and colon polyps    Anticipated Surgical Procedure:  Colonoscopy screening    The risks, benefits, and alternatives of this procedure have been discussed with the patient or the responsible party- the patient understands and agrees to proceed.                                                          "

## 2023-01-23 NOTE — DISCHARGE INSTRUCTIONS
For the next 24 hours patient needs to be with a responsible adult.    For 24 hours DO NOT drive, operate machinery, appliances, drink alcohol, make important decisions or sign legal documents.    Start with a light or bland diet if you are feeling sick to your stomach otherwise advance to regular diet as tolerated.    Follow recommendations on procedure report if provided by your doctor.    Call Dr Pavon for problems [phone]    Problems may include but not limited to: large amounts of bleeding, trouble breathing, repeated vomiting, severe unrelieved pain, fever or chills.

## 2023-01-23 NOTE — BRIEF OP NOTE
COLONOSCOPY  Progress Note    Breanne Garrido  1/23/2023    Pre-op Diagnosis:   Encounter for screening for malignant neoplasm of colon [Z12.11]       Post-Op Diagnosis Codes:     * Encounter for screening for malignant neoplasm of colon [Z12.11]     * Colon polyps [K63.5]     * Internal hemorrhoids [K64.8]    Procedure/CPT® Codes:        Procedure(s):  COLONOSCOPY into cecum and terminal ileum with hot snare polypectomies and clip x3        Surgeon(s):  Federico Pavon MD    Anesthesia: Monitored Anesthesia Care    Staff:   Endo Technician: Jazmine Galvan, PCT  Endo Nurse: Jazmine Barragan RN; Anastasia Eli RN         Estimated Blood Loss: minimal    Urine Voided: * No values recorded between 1/23/2023  9:58 AM and 1/23/2023 10:32 AM *    Specimens:                Specimens     ID Source Type Tests Collected By Collected At Frozen?    A Large Intestine, Cecum Polyp · TISSUE PATHOLOGY EXAM   Federico Pavon MD 1/23/23 1020     Description: ILEOCECAL VALVE POLYP    This specimen was not marked as sent.    B Large Intestine, Transverse Colon Polyp · TISSUE PATHOLOGY EXAM   Federico Pavon MD 1/23/23 1025     Description: TRANSVERSE COLON POLYP    This specimen was not marked as sent.    C Large Intestine, Left / Descending Colon Polyp · TISSUE PATHOLOGY EXAM   Federico Pavon MD 1/23/23 1029 No    Description: descending colon polyp    This specimen was not marked as sent.                Drains: * No LDAs found *    Findings: Colonoscopy the terminal ileum with normal ileum mucosa.  Colon polyp in the ileocecal valve as well as transverse and descending colon removed snare cautery and cold snare.  Internal hemorrhoids noted as well.  Polyp on the ileocecal valve was clipped x3 to prevent future bleeding.        Complications: None          Federico Pavon MD     Date: 1/23/2023  Time: 10:34 EST

## 2023-01-23 NOTE — ANESTHESIA POSTPROCEDURE EVALUATION
"Patient: Breanne Garrido    Procedure Summary     Date: 01/23/23 Room / Location: [facility] ENDOSCOPY 6 /  [facility]    Anesthesia Start: 0959 Anesthesia Stop: 1038    Procedure: COLONOSCOPY into cecum and terminal ileum with hot snare polypectomies and clip x3 Diagnosis:       Encounter for screening for malignant neoplasm of colon      Colon polyps      Internal hemorrhoids      (Encounter for screening for malignant neoplasm of colon [Z12.11])    Surgeons: Federico Pavon MD Provider: Yo Melchor MD    Anesthesia Type: MAC ASA Status: 2          Anesthesia Type: MAC    Vitals  Vitals Value Taken Time   /81 01/23/23 1056   Temp     Pulse 62 01/23/23 1056   Resp 18 01/23/23 1056   SpO2 96 % 01/23/23 1056           Post Anesthesia Care and Evaluation    Patient location during evaluation: bedside  Patient participation: complete - patient participated  Level of consciousness: awake and alert  Pain management: adequate    Airway patency: patent  Anesthetic complications: No anesthetic complications    Cardiovascular status: acceptable  Respiratory status: acceptable  Hydration status: acceptable    Comments: /81 (BP Location: Left arm, Patient Position: Sitting)   Pulse 62   Resp 18   Ht 165.1 cm (65\")   Wt 93.9 kg (207 lb)   LMP 12/09/2018   SpO2 96%   BMI 34.45 kg/m²           "

## 2023-01-23 NOTE — ANESTHESIA PREPROCEDURE EVALUATION
Anesthesia Evaluation     Patient summary reviewed and Nursing notes reviewed   no history of anesthetic complications:  NPO Solid Status: > 6 hours  NPO Liquid Status: > 6 hours           Airway   Mallampati: II  TM distance: >3 FB  Neck ROM: full  no difficulty expected and No difficulty expected  Dental - normal exam     Pulmonary - normal exam    breath sounds clear to auscultation  (+) asthma,  (-) rhonchi, decreased breath sounds, wheezes, rales, stridor  Cardiovascular - negative cardio ROS and normal exam    NYHA Classification: I  ECG reviewed  Rhythm: regular  Rate: normal    (-) murmur, weak pulses, friction rub, systolic click, carotid bruits, JVD, peripheral edema      Neuro/Psych- negative ROS  GI/Hepatic/Renal/Endo    (+) obesity,  GERD,      Musculoskeletal (-) negative ROS    Abdominal  - normal exam    Abdomen: soft.   Substance History - negative use     OB/GYN negative ob/gyn ROS         Other - negative ROS                       Anesthesia Plan    ASA 2     MAC     intravenous induction     Anesthetic plan, risks, benefits, and alternatives have been provided, discussed and informed consent has been obtained with: patient.        CODE STATUS:

## 2023-01-24 LAB
LAB AP CASE REPORT: NORMAL
PATH REPORT.FINAL DX SPEC: NORMAL
PATH REPORT.GROSS SPEC: NORMAL

## 2023-02-07 ENCOUNTER — TELEPHONE (OUTPATIENT)
Dept: GASTROENTEROLOGY | Facility: CLINIC | Age: 53
End: 2023-02-07
Payer: COMMERCIAL

## 2023-02-07 NOTE — TELEPHONE ENCOUNTER
----- Message from Federico Pavon MD sent at 2/5/2023  2:15 PM EST -----  Polyp benign repeat: 5 years as discussed

## 2023-04-10 ENCOUNTER — TELEPHONE (OUTPATIENT)
Dept: GASTROENTEROLOGY | Facility: CLINIC | Age: 53
End: 2023-04-10

## 2023-04-10 NOTE — TELEPHONE ENCOUNTER
Caller: Breanne Garrido    Relationship: Self    Best call back number: [phone]    What is the best time to reach you: ANY    Who are you requesting to speak with (clinical staff, provider,  specific staff member): SCHEDULING    What was the call regarding: PATIENT CALLED TO SCHEDULING AN APPT WITH GORDON OR NATALIE ASAP IN REGARDS TO HER GASTROPARESIS.   AND HAVING CONSTANT FLARE UPS.    PATIENT WOULD LIKE TO BE SEEN THIS WEDNESDAY OR THURSDAY PLEASE CALL PATIENT BACK TO SCHEDULE SOON APPT     Do you require a callback: YES

## 2023-04-11 ENCOUNTER — OFFICE VISIT (OUTPATIENT)
Dept: GASTROENTEROLOGY | Facility: CLINIC | Age: 53
End: 2023-04-11
Payer: COMMERCIAL

## 2023-04-11 VITALS
BODY MASS INDEX: 35.25 KG/M2 | HEART RATE: 68 BPM | HEIGHT: 65 IN | SYSTOLIC BLOOD PRESSURE: 119 MMHG | OXYGEN SATURATION: 99 % | TEMPERATURE: 95.3 F | WEIGHT: 211.6 LBS | DIASTOLIC BLOOD PRESSURE: 80 MMHG

## 2023-04-11 DIAGNOSIS — K31.84 GASTROPARESIS: Primary | ICD-10-CM

## 2023-04-11 PROCEDURE — 99213 OFFICE O/P EST LOW 20 MIN: CPT | Performed by: INTERNAL MEDICINE

## 2023-04-11 RX ORDER — METOCLOPRAMIDE 5 MG/1
5 TABLET ORAL
Qty: 120 TABLET | Refills: 11 | Status: SHIPPED | OUTPATIENT
Start: 2023-04-11

## 2023-04-11 NOTE — PROGRESS NOTES
Chief Complaint   Patient presents with   • Abdominal Pain   • Nausea       Breanne HILL is a  52 y.o. female here for a follow up visit for abdominal pain and nausea with history gastroparesis.    HPI     Patient 53-year-old female with history of GERD and asthma as well as history of colon polyps with known history of gastroparesis had been doing well using vagal nerve stimulation device.  Patient denies any fever or chills but over the last few weeks has been having acute increase in physical work at her job.  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  With more physical straining she notes an increase in her GI symptoms now had to leave early from work today.    Past Medical History:   Diagnosis Date   • Asthma    • GERD (gastroesophageal reflux disease)          Current Outpatient Medications:   •  albuterol sulfate  (90 Base) MCG/ACT inhaler, As Needed., Disp: , Rfl:   •  Azelastine HCl 137 MCG/SPRAY solution, , Disp: , Rfl:   •  estradiol (MINIVELLE, VIVELLE-DOT) 0.05 MG/24HR patch, , Disp: , Rfl:   •  metoclopramide (Reglan) 5 MG tablet, Take 1 tablet by mouth 4 (Four) Times a Day Before Meals & at Bedtime., Disp: 120 tablet, Rfl: 11  •  montelukast (SINGULAIR) 10 MG tablet, Take 1 tablet by mouth., Disp: , Rfl:   •  pantoprazole (PROTONIX) 40 MG EC tablet, Take 1 tablet by mouth 2 (Two) Times a Day., Disp: 180 tablet, Rfl: 3  •  Progesterone (PROMETRIUM) 100 MG capsule, Every Night., Disp: , Rfl:   •  Cyanocobalamin (B-12) 1000 MCG capsule, Take  by mouth. (Patient not taking: Reported on 2023), Disp: , Rfl:     No Known Allergies    Social History     Socioeconomic History   • Marital status:    • Number of children: 1   Tobacco Use   • Smoking status: Former     Packs/day: 1.00     Years: 25.00     Pack years: 25.00     Types: Cigarettes     Quit date: 2013     Years since quittin.9   • Smokeless  tobacco: Never   Vaping Use   • Vaping Use: Never used   Substance and Sexual Activity   • Alcohol use: Never   • Drug use: Never   • Sexual activity: Not Currently     Partners: Male     Birth control/protection: Abstinence       Family History   Problem Relation Age of Onset   • Colon polyps Mother    • Hyperlipidemia Father    • Hypertension Father    • Cirrhosis Sister    • Alcohol abuse Sister    • Colon polyps Sister    • Colon cancer Maternal Aunt    • Colon cancer Maternal Aunt    • Heart disease Maternal Grandmother    • Heart disease Paternal Grandmother    • Heart disease Paternal Grandfather        Review of Systems   Constitutional: Negative.    Respiratory: Negative.    Cardiovascular: Negative.    Gastrointestinal: Positive for abdominal distention, abdominal pain and nausea. Negative for anal bleeding, blood in stool, constipation, diarrhea, rectal pain and vomiting.   Musculoskeletal: Negative.    Skin: Negative.    Hematological: Negative.        Vitals:    04/11/23 1504   BP: 119/80   Pulse: 68   Temp: 95.3 °F (35.2 °C)   SpO2: 99%       Physical Exam  Vitals reviewed.   Constitutional:       Appearance: Normal appearance. She is well-developed.   HENT:      Head: Normocephalic and atraumatic.   Eyes:      General: No scleral icterus.     Pupils: Pupils are equal, round, and reactive to light.   Cardiovascular:      Rate and Rhythm: Normal rate and regular rhythm.      Heart sounds: Normal heart sounds.   Pulmonary:      Effort: Pulmonary effort is normal. No respiratory distress.      Breath sounds: Normal breath sounds.   Abdominal:      General: Bowel sounds are normal. There is distension.      Palpations: Abdomen is soft.      Tenderness: There is no abdominal tenderness.   Skin:     General: Skin is warm and dry.      Coloration: Skin is not jaundiced.      Findings: No rash.   Neurological:      General: No focal deficit present.      Mental Status: She is alert and oriented to person,  place, and time.      Cranial Nerves: No cranial nerve deficit.   Psychiatric:         Mood and Affect: Mood normal.         Behavior: Behavior normal.         Thought Content: Thought content normal.         No visits with results within 2 Month(s) from this visit.   Latest known visit with results is:   Admission on 01/23/2023, Discharged on 01/23/2023   Component Date Value Ref Range Status   • Case Report 01/23/2023    Final                    Value:Surgical Pathology Report                         Case: [id number]                                  Authorizing Provider:  Federico Pavon MD    Collected:           01/23/2023 10:20 AM          Ordering Location:     [facility]  Received:            01/23/2023 10:56 AM                                 [facility]                                                                  Pathologist:           Josefa Foy MD                                                    Specimens:   1) - Large Intestine, Cecum, ILEOCECAL VALVE POLYP                                                  2) - Large Intestine, Transverse Colon, TRANSVERSE COLON POLYP                                      3) - Large Intestine, Left / Descending Colon, descending colon polyp                     • Final Diagnosis 01/23/2023    Final                    Value:This result contains rich text formatting which cannot be displayed here.   • Gross Description 01/23/2023    Final                    Value:This result contains rich text formatting which cannot be displayed here.       Diagnoses and all orders for this visit:    1. Gastroparesis (Primary)    Other orders  -     metoclopramide (Reglan) 5 MG tablet; Take 1 tablet by mouth 4 (Four) Times a Day Before Meals & at Bedtime.  Dispense: 120 tablet; Refill: 11      Patient 52-year-old female with history of asthma, GERD with gastroparesis and colon polyp here for acute exacerbation of abdominal pain distention and nausea.   Symptoms she associates with her gastroparesis after a very physically stressful work week.  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  Patient wishes to go back on her Reglan to help her GI symptoms and distention which we will call in.  Patient will monitor clinically for recurrence and will recommend repeating the gastric emptying scan to assess for change in her GI function before considering sending her on for further intervention.

## 2023-04-14 ENCOUNTER — TELEPHONE (OUTPATIENT)
Dept: GASTROENTEROLOGY | Facility: CLINIC | Age: 53
End: 2023-04-14
Payer: COMMERCIAL

## 2023-04-14 NOTE — TELEPHONE ENCOUNTER
Received FMLA paperwork for patient. Called patient to clarify what is needed. Patient needs paperwork to cover her gastroparesis flares. She is averaging 2-3 days per month of missed work during a flare (as this recent flare was her first). Her medical department advised to have FMLA filled out to cover her. Paperwork is due 4/21/23.

## 2023-04-18 NOTE — TELEPHONE ENCOUNTER
Called patient to let her know forms have been signed. Patient verb understanding and will pay and pick them up here in the office today. Copy to be scanned into chart.

## 2023-04-28 ENCOUNTER — TELEPHONE (OUTPATIENT)
Dept: GASTROENTEROLOGY | Facility: CLINIC | Age: 53
End: 2023-04-28
Payer: COMMERCIAL

## 2023-04-28 NOTE — TELEPHONE ENCOUNTER
Hub staff attempted to follow warm transfer process and was unsuccessful     Caller: Breanne HILL    Relationship to patient: Self    Best call back number: [phone]    Patient is needing: PT CALLED TO GO OVER BREATHE TEST. PLEASE CALL TO DISCUS.    BEST TO REACH PT AT ANY TIME. AS SOON AS POSSIBLE     THANK YOU.

## 2023-05-01 ENCOUNTER — TELEPHONE (OUTPATIENT)
Dept: GASTROENTEROLOGY | Facility: CLINIC | Age: 53
End: 2023-05-01
Payer: COMMERCIAL

## 2023-06-01 DIAGNOSIS — K29.00 ACUTE GASTRITIS WITHOUT HEMORRHAGE, UNSPECIFIED GASTRITIS TYPE: ICD-10-CM

## 2023-06-02 RX ORDER — PANTOPRAZOLE SODIUM 40 MG/1
TABLET, DELAYED RELEASE ORAL
Qty: 180 TABLET | Refills: 3 | Status: SHIPPED | OUTPATIENT
Start: 2023-06-02

## (undated) DEVICE — THE SINGLE USE ETRAP – POLYP TRAP IS USED FOR SUCTION RETRIEVAL OF ENDOSCOPICALLY REMOVED POLYPS.: Brand: ETRAP

## (undated) DEVICE — ADAPT CLN BIOGUARD AIR/H2O DISP

## (undated) DEVICE — CANN O2 ETCO2 FITS ALL CONN CO2 SMPL A/ 7IN DISP LF

## (undated) DEVICE — SENSR O2 OXIMAX FNGR A/ 18IN NONSTR

## (undated) DEVICE — TUBING, SUCTION, 1/4" X 10', STRAIGHT: Brand: MEDLINE

## (undated) DEVICE — KT ORCA ORCAPOD DISP STRL

## (undated) DEVICE — BITEBLOCK OMNI BLOC

## (undated) DEVICE — LN SMPL CO2 SHTRM SD STREAM W/M LUER

## (undated) DEVICE — SNAR POLYP CAPTIVATOR HEX 27MM 240CM

## (undated) DEVICE — FRCP BX RADJAW4 NDL 2.8 240CM LG OG BX40